# Patient Record
Sex: MALE | Race: WHITE | NOT HISPANIC OR LATINO | Employment: OTHER | URBAN - METROPOLITAN AREA
[De-identification: names, ages, dates, MRNs, and addresses within clinical notes are randomized per-mention and may not be internally consistent; named-entity substitution may affect disease eponyms.]

---

## 2018-02-07 ENCOUNTER — HOSPITAL ENCOUNTER (EMERGENCY)
Facility: HOSPITAL | Age: 78
Discharge: HOME | End: 2018-02-07
Attending: EMERGENCY MEDICINE
Payer: MEDICARE

## 2018-02-07 VITALS
SYSTOLIC BLOOD PRESSURE: 149 MMHG | TEMPERATURE: 99 F | HEART RATE: 101 BPM | WEIGHT: 195 LBS | OXYGEN SATURATION: 95 % | HEIGHT: 67 IN | BODY MASS INDEX: 30.61 KG/M2 | DIASTOLIC BLOOD PRESSURE: 88 MMHG | RESPIRATION RATE: 22 BRPM
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
## 2018-02-07 DIAGNOSIS — S01.81XA FACIAL LACERATION, INITIAL ENCOUNTER: ICD-10-CM
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
## 2018-02-07 DIAGNOSIS — F10.10 ALCOHOL ABUSE: ICD-10-CM

## 2018-02-07 DIAGNOSIS — S22.39XA RIB FRACTURE: ICD-10-CM
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
## 2018-02-07 LAB
ALBUMIN SERPL BCP-MCNC: 3.8 G/DL
ALP SERPL-CCNC: 67 U/L
ALT SERPL W/O P-5'-P-CCNC: 23 U/L
ANION GAP SERPL CALC-SCNC: 12 MMOL/L
APTT BLDCRRT: 31.7 SEC
AST SERPL-CCNC: 26 U/L
BASOPHILS # BLD AUTO: 0.01 K/UL
BASOPHILS NFR BLD: 0.1 %
BILIRUB SERPL-MCNC: 0.4 MG/DL
BUN SERPL-MCNC: 17 MG/DL
CALCIUM SERPL-MCNC: 9 MG/DL
CHLORIDE SERPL-SCNC: 104 MMOL/L
CO2 SERPL-SCNC: 22 MMOL/L
CREAT SERPL-MCNC: 1 MG/DL
DIFFERENTIAL METHOD: ABNORMAL
EOSINOPHIL # BLD AUTO: 0.2 K/UL
EOSINOPHIL NFR BLD: 1.9 %
ERYTHROCYTE [DISTWIDTH] IN BLOOD BY AUTOMATED COUNT: 13.1 %
EST. GFR  (AFRICAN AMERICAN): >60 ML/MIN/1.73 M^2
EST. GFR  (NON AFRICAN AMERICAN): >60 ML/MIN/1.73 M^2
ETHANOL SERPL-MCNC: 193 MG/DL
GLUCOSE SERPL-MCNC: 116 MG/DL
HCT VFR BLD AUTO: 42.5 %
HGB BLD-MCNC: 15 G/DL
INR PPP: 1
LYMPHOCYTES # BLD AUTO: 1.4 K/UL
LYMPHOCYTES NFR BLD: 18.1 %
MCH RBC QN AUTO: 32.7 PG
MCHC RBC AUTO-ENTMCNC: 35.3 G/DL
MCV RBC AUTO: 93 FL
MONOCYTES # BLD AUTO: 0.7 K/UL
MONOCYTES NFR BLD: 8.9 %
NEUTROPHILS # BLD AUTO: 5.5 K/UL
NEUTROPHILS NFR BLD: 71 %
PLATELET # BLD AUTO: 261 K/UL
PMV BLD AUTO: 8.9 FL
POTASSIUM SERPL-SCNC: 4.4 MMOL/L
PROT SERPL-MCNC: 7 G/DL
PROTHROMBIN TIME: 10.6 SEC
RBC # BLD AUTO: 4.59 M/UL
SODIUM SERPL-SCNC: 138 MMOL/L
WBC # BLD AUTO: 7.74 K/UL

## 2018-02-07 PROCEDURE — 99284 EMERGENCY DEPT VISIT MOD MDM: CPT | Mod: 25

## 2018-02-07 PROCEDURE — 90715 TDAP VACCINE 7 YRS/> IM: CPT | Performed by: EMERGENCY MEDICINE

## 2018-02-07 PROCEDURE — 93005 ELECTROCARDIOGRAM TRACING: CPT | Mod: 59

## 2018-02-07 PROCEDURE — 85730 THROMBOPLASTIN TIME PARTIAL: CPT

## 2018-02-07 PROCEDURE — 96365 THER/PROPH/DIAG IV INF INIT: CPT | Mod: 59

## 2018-02-07 PROCEDURE — 12013 RPR F/E/E/N/L/M 2.6-5.0 CM: CPT | Mod: RT

## 2018-02-07 PROCEDURE — 80320 DRUG SCREEN QUANTALCOHOLS: CPT

## 2018-02-07 PROCEDURE — 93010 ELECTROCARDIOGRAM REPORT: CPT | Mod: ,,, | Performed by: INTERNAL MEDICINE

## 2018-02-07 PROCEDURE — 90471 IMMUNIZATION ADMIN: CPT | Performed by: EMERGENCY MEDICINE

## 2018-02-07 PROCEDURE — 25000003 PHARM REV CODE 250: Performed by: EMERGENCY MEDICINE

## 2018-02-07 PROCEDURE — 80053 COMPREHEN METABOLIC PANEL: CPT

## 2018-02-07 PROCEDURE — 85610 PROTHROMBIN TIME: CPT

## 2018-02-07 PROCEDURE — 63600175 PHARM REV CODE 636 W HCPCS: Performed by: EMERGENCY MEDICINE

## 2018-02-07 PROCEDURE — 85025 COMPLETE CBC W/AUTO DIFF WBC: CPT

## 2018-02-07 RX ORDER — MULTIVITAMIN
TABLET ORAL
COMMUNITY

## 2018-02-07 RX ORDER — TERAZOSIN 5 MG/1
5 CAPSULE ORAL DAILY
COMMUNITY
Start: 2017-02-21 | End: 2021-07-02

## 2018-02-07 RX ORDER — LISINOPRIL AND HYDROCHLOROTHIAZIDE 10; 12.5 MG/1; MG/1
TABLET ORAL
COMMUNITY
Start: 2017-12-05 | End: 2023-04-11

## 2018-02-07 RX ORDER — FLUTICASONE PROPIONATE 50 MCG
1 SPRAY, SUSPENSION (ML) NASAL
COMMUNITY
Start: 2017-02-21

## 2018-02-07 RX ORDER — GLUCOSAMINE/CHONDRO SU A 500-400 MG
TABLET ORAL DAILY
COMMUNITY

## 2018-02-07 RX ORDER — ALPRAZOLAM 0.5 MG/1
0.5 TABLET ORAL DAILY
COMMUNITY
Start: 2017-11-29 | End: 2021-06-28 | Stop reason: SDUPTHER

## 2018-02-07 RX ORDER — ASPIRIN 325 MG
325 TABLET ORAL DAILY
COMMUNITY

## 2018-02-07 RX ADMIN — CLOSTRIDIUM TETANI TOXOID ANTIGEN (FORMALDEHYDE INACTIVATED), CORYNEBACTERIUM DIPHTHERIAE TOXOID ANTIGEN (FORMALDEHYDE INACTIVATED), BORDETELLA PERTUSSIS TOXOID ANTIGEN (GLUTARALDEHYDE INACTIVATED), BORDETELLA PERTUSSIS FILAMENTOUS HEMAGGLUTININ ANTIGEN (FORMALDEHYDE INACTIVATED), BORDETELLA PERTUSSIS PERTACTIN ANTIGEN, AND BORDETELLA PERTUSSIS FIMBRIAE 2/3 ANTIGEN 0.5 ML: 5; 2; 2.5; 5; 3; 5 INJECTION, SUSPENSION INTRAMUSCULAR at 07:02

## 2018-02-07 RX ADMIN — THIAMINE HYDROCHLORIDE 100 MG: 100 INJECTION, SOLUTION INTRAMUSCULAR; INTRAVENOUS at 07:02

## 2018-02-07 RX ADMIN — SODIUM CHLORIDE 1000 ML: 0.9 INJECTION, SOLUTION INTRAVENOUS at 07:02

## 2018-02-07 NOTE — ED PROVIDER NOTES
SCRIBE #1 NOTE: I, Patrick Spencer, am scribing for, and in the presence of, Klaudia Matt MD. I have scribed the entire note.      History      Chief Complaint   Patient presents with    Laceration     trip after dizziness this morning after ETOH and hit head. Lac to R forehead. Denies LOC.        Review of patient's allergies indicates:  No Known Allergies     HPI   HPI    2/7/2018, 7:00 AM   History obtained from the patient and wife      History of Present Illness: Dano Chau is a 77 y.o. male patient who presents to the Emergency Department for laceration which onset suddenly this morning after pt fell. Sxs are constant and moderate in severity. Laceration located above right eyebrow. Pt reports ETOH use last night. Pt states he slipped and fell, hitting head on nightstand after standing up to use bathroom. There are no mitigating or exacerbating factors noted.  Pt denies any fever, N/V/D, LOC, SZ, SOB, CP, neck pain, back pain, ABD pain, and all other sxs at this time. No further complaints or concerns at this time.       Arrival mode: Personal vehicle     PCP: No primary care provider on file.     Past Medical History:   Diagnosis Date    Anxiety     HTN (hypertension)     Restless leg syndrome        Past Surgical History:  Past surgical history reviewed not relevant      Family History:  Family history reviewed not relevant      Social History:  Social History    Social History     Social History    Marital status:      Spouse name: N/A    Number of children: N/A    Years of education: N/A     Occupational History    Not on file.     Social History Main Topics    Smoking status: Former Smoker     Quit date: 1980    Smokeless tobacco: Never Used    Alcohol use Yes    Drug use: No    Sexual activity: Yes     Other Topics Concern    Not on file     Social History Narrative    No narrative on file             ROS   Review of Systems   Constitutional: Negative for fever.    HENT: Negative for sore throat.    Respiratory: Negative for shortness of breath.    Cardiovascular: Negative for chest pain.   Gastrointestinal: Negative for abdominal pain, constipation, diarrhea, nausea and vomiting.   Genitourinary: Negative for dysuria.   Musculoskeletal: Negative for back pain, gait problem and neck pain.   Skin: Positive for wound (lac to right forehead). Negative for rash.   Neurological: Negative for seizures, syncope, weakness, numbness and headaches.   Hematological: Does not bruise/bleed easily.     Physical Exam      Initial Vitals [02/07/18 0658]   BP Pulse Resp Temp SpO2   137/82 (!) 111 (!) 22 98.5 °F (36.9 °C) (!) 88 %      MAP       100.33          Physical Exam  Nursing Notes and Vital Signs Reviewed.  Constitutional: Patient is in no acute distress. Well-developed and well-nourished.  Head: 4 cm uncomplicated laceration superior to right eyebrow, no hematoma, no active bleeding, no contusion. Normocephalic.  Eyes: PERRL. EOM intact. Conjunctivae are not pale. No scleral icterus.  ENT: Mucous membranes are moist. Oropharynx is clear and symmetric.    Neck: Supple. Full ROM. No lymphadenopathy.  Cardiovascular: Tachycardic. Regular rhythm. No murmurs, rubs, or gallops. Distal pulses are 2+ and symmetric.  Pulmonary/Chest: No respiratory distress. Clear to auscultation bilaterally. No wheezing or rales.  Abdominal: Soft and non-distended.  There is no tenderness.  No rebound, guarding, or rigidity. Good bowel sounds.  Genitourinary: No CVA tenderness  Musculoskeletal: Moves all extremities. No obvious deformities. No edema. No calf tenderness.  Skin: Warm and dry.  Neurological:  Alert, awake, and appropriate.  Normal speech.  No acute focal neurological deficits are appreciated.  Psychiatric: Normal affect. Good eye contact. Appropriate in content.    ED Course    Lac Repair  Date/Time: 2/7/2018 8:19 AM  Performed by: ISA STEPHENSON  Authorized by: ISA STEPHENSON  "  Body area: head/neck  Location details: right eyebrow  Laceration length: 4 cm  Foreign bodies: no foreign bodies  Tendon involvement: none  Nerve involvement: none  Vascular damage: no  Patient sedated: no  Preparation: Patient was prepped and draped in the usual sterile fashion.  Irrigation solution: saline  Amount of cleaning: standard  Debridement: none  Degree of undermining: none  Skin closure: glue (dermabond)  Technique: simple  Dressing: steri strips.  Patient tolerance: Patient tolerated the procedure well with no immediate complications        ED Vital Signs:  Vitals:    02/07/18 0658 02/07/18 0709 02/07/18 0733 02/07/18 0750   BP: 137/82  139/85 130/82   Pulse: (!) 111 104 98 95   Resp: (!) 22  (!) 22    Temp: 98.5 °F (36.9 °C)      TempSrc: Oral      SpO2: (!) 88% (!) 94% (!) 94%    Weight: 88.5 kg (195 lb)      Height: 5' 7" (1.702 m)       02/07/18 0752 02/07/18 0754 02/07/18 0826   BP: 131/81 139/89 (!) 149/88   Pulse: 103 110 101   Resp:   (!) 22   Temp:      TempSrc:      SpO2:   95%   Weight:      Height:          Abnormal Lab Results:  Labs Reviewed   CBC W/ AUTO DIFFERENTIAL - Abnormal; Notable for the following:        Result Value    RBC 4.59 (*)     MCH 32.7 (*)     MPV 8.9 (*)     All other components within normal limits   COMPREHENSIVE METABOLIC PANEL - Abnormal; Notable for the following:     CO2 22 (*)     Glucose 116 (*)     All other components within normal limits   ALCOHOL,MEDICAL (ETHANOL) - Abnormal; Notable for the following:     Alcohol, Medical, Serum 193 (*)     All other components within normal limits   PROTIME-INR   APTT        All Lab Results:  Results for orders placed or performed during the hospital encounter of 02/07/18   CBC auto differential   Result Value Ref Range    WBC 7.74 3.90 - 12.70 K/uL    RBC 4.59 (L) 4.60 - 6.20 M/uL    Hemoglobin 15.0 14.0 - 18.0 g/dL    Hematocrit 42.5 40.0 - 54.0 %    MCV 93 82 - 98 fL    MCH 32.7 (H) 27.0 - 31.0 pg    MCHC 35.3 32.0 - " 36.0 g/dL    RDW 13.1 11.5 - 14.5 %    Platelets 261 150 - 350 K/uL    MPV 8.9 (L) 9.2 - 12.9 fL    Gran # (ANC) 5.5 1.8 - 7.7 K/uL    Lymph # 1.4 1.0 - 4.8 K/uL    Mono # 0.7 0.3 - 1.0 K/uL    Eos # 0.2 0.0 - 0.5 K/uL    Baso # 0.01 0.00 - 0.20 K/uL    Gran% 71.0 38.0 - 73.0 %    Lymph% 18.1 18.0 - 48.0 %    Mono% 8.9 4.0 - 15.0 %    Eosinophil% 1.9 0.0 - 8.0 %    Basophil% 0.1 0.0 - 1.9 %    Differential Method Automated    Comprehensive metabolic panel   Result Value Ref Range    Sodium 138 136 - 145 mmol/L    Potassium 4.4 3.5 - 5.1 mmol/L    Chloride 104 95 - 110 mmol/L    CO2 22 (L) 23 - 29 mmol/L    Glucose 116 (H) 70 - 110 mg/dL    BUN, Bld 17 8 - 23 mg/dL    Creatinine 1.0 0.5 - 1.4 mg/dL    Calcium 9.0 8.7 - 10.5 mg/dL    Total Protein 7.0 6.0 - 8.4 g/dL    Albumin 3.8 3.5 - 5.2 g/dL    Total Bilirubin 0.4 0.1 - 1.0 mg/dL    Alkaline Phosphatase 67 55 - 135 U/L    AST 26 10 - 40 U/L    ALT 23 10 - 44 U/L    Anion Gap 12 8 - 16 mmol/L    eGFR if African American >60 >60 mL/min/1.73 m^2    eGFR if non African American >60 >60 mL/min/1.73 m^2   Ethanol   Result Value Ref Range    Alcohol, Medical, Serum 193 (H) <10 mg/dL   Protime-INR   Result Value Ref Range    Prothrombin Time 10.6 9.0 - 12.5 sec    INR 1.0 0.8 - 1.2   APTT   Result Value Ref Range    aPTT 31.7 21.0 - 32.0 sec         Imaging Results:  Imaging Results          CT Head Without Contrast (Final result)  Result time 02/07/18 08:13:16    Final result by Benjaimn Vann III, MD (02/07/18 08:13:16)                 Impression:     A atrophy with bilateral white matter changes which may be related to microvascular disease. No bleed or other acute intracranial event suggested.    All CT scans at this facility use dose modulation, iterative reconstruction, and/or weight based dosing when appropriate to reduce radiation dose to as low as reasonably achievable.      Electronically signed by: BENJAMIN VANN MD  Date:     02/07/18  Time:    08:13               Narrative:    CT of the head without contrast.    Clinical indication: Headache, post trauma .    Standard noncontrast CT scan of the brain. No previous for comparison. The scan is degraded by motion artifact.        The brain and ventricles show mild changes of atrophy. Minimal to mild low attenuation changes are noted in the periventricular and subcortical white matter bilaterally, most likely related to microvascular disease. No hemorrhage, mass lesion, hydrocephalus, or midline shift. No acute/depressed skull fracture.                             X-Ray Chest AP Portable (Final result)  Result time 02/07/18 07:47:31    Final result by COLLEEN El Sr., MD (02/07/18 07:47:31)                 Impression:        1. There is a poorly visualized fracture in the lateral aspect of the right 10th rib.  2. The lungs are clear.       Electronically signed by: COLLEEN EL MD  Date:     02/07/18  Time:    07:47              Narrative:    One-view chest x-ray    Clinical History: Unspecified fall, initial encounter    Finding: The size of the heart is normal. The lungs are clear. There is no pneumothorax.  The costophrenic angles are sharp. There is a poorly visualized fracture in the lateral aspect of the right 10th rib.                                      The EKG was ordered, reviewed, and independently interpreted by the ED provider.  Interpretation time: 7:27  Rate: 101 BPM  Rhythm: sinus tachycardia  Interpretation: Low voltage QRS. No STEMI.    The Emergency Provider reviewed the vital signs and test results, which are outlined above.    ED Discussion     8:20 AM: Reassessed pt. Pt states their condition has improved at this time. Pt reports rib Fx seen on CXR is old. Discussed with pt all pertinent ED information and results. Discussed plan of treatment with pt. Gave pt all f/u and return to the ED instructions. Pt being discharged to care of wife at the bedside.  All questions and concerns were  addressed at this time. Pt understands and agrees to plan as discussed. Pt is stable for discharge.     I discussed wound care precautions with patient and/or family/caretaker; specifically that all wounds have risk of infection despite efforts to cleanse and debride the wound; and there is a risk of an occult foreign body (and thus increased risk of infection) despite a negative examination.  I discussed with patient need to return for any signs of infection, specifically redness, increased pain, fever, drainage of pus, or any concern, immediately.    ED Medication(s):  Medications   sodium chloride 0.9% bolus 1,000 mL (0 mLs Intravenous Stopped 2/7/18 0828)   thiamine (B-1) 100 mg in dextrose 5 % 50 mL IVPB (0 mg Intravenous Stopped 2/7/18 0828)   Tdap vaccine injection 0.5 mL (0.5 mLs Intramuscular Given 2/7/18 0728)       There are no discharge medications for this patient.      Follow-up Information     Local Primary Care Doctor. Schedule an appointment as soon as possible for a visit in 2 days.    Why:  Return to the Emergency Room, If symptoms worsen                   Medical Decision Making    Medical Decision Making:   Clinical Tests:   Lab Tests: Reviewed and Ordered  Radiological Study: Ordered and Reviewed  Medical Tests: Ordered and Reviewed           Scribe Attestation:   Scribe #1: I performed the above scribed service and the documentation accurately describes the services I performed. I attest to the accuracy of the note.    Attending:   Physician Attestation Statement for Scribe #1: I, Klaudia Matt MD, personally performed the services described in this documentation, as scribed by Patrick Spencer, in my presence, and it is both accurate and complete.          Clinical Impression       ICD-10-CM ICD-9-CM   1. Fall at home, initial encounter W19.XXXA E888.9    Y92.099 E849.0   2. Fall W19.XXXA E888.9   3. Rib fracture S22.39XA 807.00   4. Facial laceration, initial encounter S01.81XA  873.40   5. Alcohol abuse F10.10 305.00       Disposition:   Disposition: Discharged  Condition: Stable         Klaudia Matt MD  02/08/18 1039

## 2018-02-07 NOTE — ED NOTES
Patient complains of head laceration to R side of face. Bleeding controlled at this time. Symptoms have been present since fall onto nightstand this morning after an evening of drinking alcohol.   Level of Consciousness: The patient is awake, alert, and oriented with appropriate affect and speech; oriented to person, place and time.  Appearance: Sitting up in ED stretcher with no acute distress noted. Clothing and hygiene are clean and worn appropriately.  Skin:  Head laceration noted to R side of forehead other skin is intact; color consistent with ethnicity.    HEENT: Laceration noted to R side of forehead.  Musculoskeletal: Moves all extremities well in full range of motion. No obvious deformities or swelling noted.  Respiratory: Airway open and patent, respirations spontaneous, even and unlabored. No accessory muscles in use.   Cardiac: Regular rate, no peripheral edema noted..  Abdomen:  No distention noted.  Neurologic: PERRLA, face exhibits symmetrical expression, reports normal sensation to all extremities and face.    Patient verbalized understanding of status and plan of care.

## 2021-06-28 ENCOUNTER — OFFICE VISIT (OUTPATIENT)
Dept: INTERNAL MEDICINE | Facility: CLINIC | Age: 81
End: 2021-06-28
Payer: MEDICARE

## 2021-06-28 ENCOUNTER — HOSPITAL ENCOUNTER (OUTPATIENT)
Dept: RADIOLOGY | Facility: HOSPITAL | Age: 81
Discharge: HOME | End: 2021-06-28
Attending: FAMILY MEDICINE
Payer: MEDICARE

## 2021-06-28 VITALS
HEIGHT: 67 IN | HEART RATE: 103 BPM | OXYGEN SATURATION: 96 % | DIASTOLIC BLOOD PRESSURE: 70 MMHG | SYSTOLIC BLOOD PRESSURE: 110 MMHG | BODY MASS INDEX: 31.83 KG/M2 | WEIGHT: 202.81 LBS | TEMPERATURE: 97 F

## 2021-06-28 DIAGNOSIS — Z28.39 IMMUNIZATION DEFICIENCY: ICD-10-CM

## 2021-06-28 DIAGNOSIS — I10 ESSENTIAL HYPERTENSION: ICD-10-CM

## 2021-06-28 DIAGNOSIS — G47.00 INSOMNIA, UNSPECIFIED TYPE: ICD-10-CM

## 2021-06-28 DIAGNOSIS — M47.812 SPONDYLOSIS OF CERVICAL REGION WITHOUT MYELOPATHY OR RADICULOPATHY: ICD-10-CM

## 2021-06-28 DIAGNOSIS — R06.02 SHORTNESS OF BREATH: ICD-10-CM

## 2021-06-28 DIAGNOSIS — M47.816 SPONDYLOSIS OF LUMBAR REGION WITHOUT MYELOPATHY OR RADICULOPATHY: ICD-10-CM

## 2021-06-28 DIAGNOSIS — Z76.89 ENCOUNTER TO ESTABLISH CARE WITH NEW DOCTOR: Primary | ICD-10-CM

## 2021-06-28 PROCEDURE — 99999 PR PBB SHADOW E&M-NEW PATIENT-LVL IV: CPT | Mod: PBBFAC,,, | Performed by: FAMILY MEDICINE

## 2021-06-28 PROCEDURE — 1159F PR MEDICATION LIST DOCUMENTED IN MEDICAL RECORD: ICD-10-PCS | Mod: S$GLB,,, | Performed by: FAMILY MEDICINE

## 2021-06-28 PROCEDURE — 71046 X-RAY EXAM CHEST 2 VIEWS: CPT | Mod: TC

## 2021-06-28 PROCEDURE — 1101F PR PT FALLS ASSESS DOC 0-1 FALLS W/OUT INJ PAST YR: ICD-10-PCS | Mod: CPTII,S$GLB,, | Performed by: FAMILY MEDICINE

## 2021-06-28 PROCEDURE — 1101F PT FALLS ASSESS-DOCD LE1/YR: CPT | Mod: CPTII,S$GLB,, | Performed by: FAMILY MEDICINE

## 2021-06-28 PROCEDURE — 99204 OFFICE O/P NEW MOD 45 MIN: CPT | Mod: S$GLB,,, | Performed by: FAMILY MEDICINE

## 2021-06-28 PROCEDURE — 99999 PR PBB SHADOW E&M-NEW PATIENT-LVL IV: ICD-10-PCS | Mod: PBBFAC,,, | Performed by: FAMILY MEDICINE

## 2021-06-28 PROCEDURE — 1125F AMNT PAIN NOTED PAIN PRSNT: CPT | Mod: S$GLB,,, | Performed by: FAMILY MEDICINE

## 2021-06-28 PROCEDURE — 3288F PR FALLS RISK ASSESSMENT DOCUMENTED: ICD-10-PCS | Mod: CPTII,S$GLB,, | Performed by: FAMILY MEDICINE

## 2021-06-28 PROCEDURE — 71046 X-RAY EXAM CHEST 2 VIEWS: CPT | Mod: 26,,, | Performed by: RADIOLOGY

## 2021-06-28 PROCEDURE — 1125F PR PAIN SEVERITY QUANTIFIED, PAIN PRESENT: ICD-10-PCS | Mod: S$GLB,,, | Performed by: FAMILY MEDICINE

## 2021-06-28 PROCEDURE — 1159F MED LIST DOCD IN RCRD: CPT | Mod: S$GLB,,, | Performed by: FAMILY MEDICINE

## 2021-06-28 PROCEDURE — 99204 PR OFFICE/OUTPT VISIT, NEW, LEVL IV, 45-59 MIN: ICD-10-PCS | Mod: S$GLB,,, | Performed by: FAMILY MEDICINE

## 2021-06-28 PROCEDURE — 71046 XR CHEST PA AND LATERAL: ICD-10-PCS | Mod: 26,,, | Performed by: RADIOLOGY

## 2021-06-28 PROCEDURE — 3288F FALL RISK ASSESSMENT DOCD: CPT | Mod: CPTII,S$GLB,, | Performed by: FAMILY MEDICINE

## 2021-06-28 RX ORDER — ACETAMINOPHEN 325 MG/1
650 TABLET ORAL
COMMUNITY

## 2021-06-28 RX ORDER — SAW PALMETTO 160 MG
160 CAPSULE ORAL
COMMUNITY

## 2021-06-28 RX ORDER — ALPRAZOLAM 0.5 MG/1
0.5 TABLET ORAL NIGHTLY PRN
Qty: 90 TABLET | Refills: 0 | Status: SHIPPED | OUTPATIENT
Start: 2021-06-28 | End: 2021-07-06

## 2021-06-28 RX ORDER — LISINOPRIL 10 MG/1
10 TABLET ORAL DAILY
Qty: 90 TABLET | Refills: 3 | Status: SHIPPED | OUTPATIENT
Start: 2021-06-28 | End: 2021-07-02

## 2021-06-28 RX ORDER — TRAZODONE HYDROCHLORIDE 150 MG/1
1 TABLET ORAL NIGHTLY
COMMUNITY
Start: 2021-05-21 | End: 2021-10-21

## 2021-07-05 ENCOUNTER — TELEPHONE (OUTPATIENT)
Dept: INTERNAL MEDICINE | Facility: CLINIC | Age: 81
End: 2021-07-05

## 2021-07-06 RX ORDER — LISINOPRIL 10 MG/1
TABLET ORAL
Qty: 90 TABLET | Refills: 3 | Status: SHIPPED | OUTPATIENT
Start: 2021-07-06 | End: 2022-08-31

## 2021-07-06 RX ORDER — ALPRAZOLAM 0.5 MG/1
0.5 TABLET ORAL NIGHTLY PRN
Qty: 90 TABLET | Refills: 0 | Status: SHIPPED | OUTPATIENT
Start: 2021-07-06 | End: 2021-11-10

## 2021-07-06 RX ORDER — TERAZOSIN 5 MG/1
CAPSULE ORAL
Qty: 90 CAPSULE | Refills: 3 | Status: SHIPPED | OUTPATIENT
Start: 2021-07-06 | End: 2022-07-20

## 2021-07-08 ENCOUNTER — TELEPHONE (OUTPATIENT)
Dept: INTERNAL MEDICINE | Facility: CLINIC | Age: 81
End: 2021-07-08

## 2021-10-21 RX ORDER — TRAZODONE HYDROCHLORIDE 150 MG/1
150 TABLET ORAL NIGHTLY
Qty: 90 TABLET | Refills: 0 | Status: SHIPPED | OUTPATIENT
Start: 2021-10-21 | End: 2021-12-28

## 2021-11-08 ENCOUNTER — IMMUNIZATION (OUTPATIENT)
Dept: PRIMARY CARE CLINIC | Facility: CLINIC | Age: 81
End: 2021-11-08
Payer: MEDICARE

## 2021-11-08 DIAGNOSIS — Z23 NEED FOR VACCINATION: Primary | ICD-10-CM

## 2021-11-08 PROCEDURE — 91300 COVID-19, MRNA, LNP-S, PF, 30 MCG/0.3 ML DOSE VACCINE: CPT | Mod: PBBFAC | Performed by: FAMILY MEDICINE

## 2021-11-08 PROCEDURE — 0003A COVID-19, MRNA, LNP-S, PF, 30 MCG/0.3 ML DOSE VACCINE: CPT | Mod: CV19,PBBFAC | Performed by: FAMILY MEDICINE

## 2021-11-10 RX ORDER — ALPRAZOLAM 0.5 MG/1
TABLET ORAL
Qty: 90 TABLET | Refills: 0 | Status: SHIPPED | OUTPATIENT
Start: 2021-11-10 | End: 2022-02-25

## 2022-02-22 NOTE — TELEPHONE ENCOUNTER
No new care gaps identified.  Powered by [employer]. Reference number: [id number].   2/22/2022 3:12:09 PM CST

## 2022-02-25 RX ORDER — ALPRAZOLAM 0.5 MG/1
TABLET ORAL
Qty: 30 TABLET | Refills: 0 | Status: SHIPPED | OUTPATIENT
Start: 2022-02-25 | End: 2023-04-11 | Stop reason: SDUPTHER

## 2022-03-11 NOTE — TELEPHONE ENCOUNTER
No new care gaps identified.  Powered by [employer]. Reference number: [id number].   3/11/2022 2:50:49 AM CST

## 2022-03-15 RX ORDER — TRAZODONE HYDROCHLORIDE 150 MG/1
150 TABLET ORAL NIGHTLY
Qty: 90 TABLET | Refills: 0 | Status: SHIPPED | OUTPATIENT
Start: 2022-03-15 | End: 2022-05-27

## 2022-03-15 NOTE — TELEPHONE ENCOUNTER
This Rx Request does not qualify for assessment with the [facility]   Please review protocol details and the Care Due Message for extra clinical information    Reasons Rx Request may be deferred:  1. Labs/Vitals overdue  2. Labs/Vitals abnormal  3. Patient has been seen in the ED/Hospital since the last PCP visit  4. Medication is non-delegated  5. Medication associated diagnosis code is outside of scope  6. Provider is a non-participating provider  7. Visit criteria not met (Patient needs to be seen at least every 15 months by PCP)    Note composed:2:32 PM 03/15/2022

## 2022-07-06 DIAGNOSIS — I10 ESSENTIAL HYPERTENSION: ICD-10-CM

## 2023-04-04 NOTE — TELEPHONE ENCOUNTER
Care Due:                  Date            Visit Type   Department     Provider  --------------------------------------------------------------------------------                                NP -                              PRIMARY      [facility] INTER[facility]  Last Visit: 06-      CARE ([facility])   DAYA Santacruz                              EP -                              PRIMARY      [facility] INTERNAL  Next Visit: 04-      CARE ([facility])   DAYA Santacruz                                                            Last  Test          Frequency    Reason                     Performed    Due Date  --------------------------------------------------------------------------------    CMP.........  12 months..  lisinopriL...............  06- 06-    [facility] Embedded Care Gaps. Reference number: [id number]. 4/04/2023   3:55:17 PM CDT

## 2023-04-04 NOTE — TELEPHONE ENCOUNTER
----- Message from She Palma sent at 4/4/2023  3:44 PM CDT -----  Regarding: refill  Can the clinic reply in [facility]: no      Please refill the medication(s) listed below.   Please call the patient when the prescription(s) is ready for  at this phone number         Medication #1 lisinopriL 10 MG tablet    Medication #2      Preferred Pharmacy:     [facility] - [place], [place] - [address]  [address] [address]  Phone: [phone] Fax: [phone]

## 2023-04-05 RX ORDER — LISINOPRIL 10 MG/1
TABLET ORAL
Qty: 90 TABLET | Refills: 0 | Status: SHIPPED | OUTPATIENT
Start: 2023-04-05 | End: 2023-04-11 | Stop reason: SDUPTHER

## 2023-04-11 ENCOUNTER — LAB VISIT (OUTPATIENT)
Dept: LAB | Facility: HOSPITAL | Age: 83
End: 2023-04-11
Attending: FAMILY MEDICINE
Payer: MEDICARE

## 2023-04-11 ENCOUNTER — OFFICE VISIT (OUTPATIENT)
Dept: INTERNAL MEDICINE | Facility: CLINIC | Age: 83
End: 2023-04-11
Payer: MEDICARE

## 2023-04-11 VITALS
TEMPERATURE: 98 F | BODY MASS INDEX: 31.18 KG/M2 | SYSTOLIC BLOOD PRESSURE: 140 MMHG | WEIGHT: 198.63 LBS | HEIGHT: 67 IN | OXYGEN SATURATION: 96 % | DIASTOLIC BLOOD PRESSURE: 86 MMHG | HEART RATE: 104 BPM

## 2023-04-11 DIAGNOSIS — Z00.00 ANNUAL PHYSICAL EXAM: Primary | ICD-10-CM

## 2023-04-11 DIAGNOSIS — I10 ESSENTIAL HYPERTENSION: ICD-10-CM

## 2023-04-11 DIAGNOSIS — Z28.39 IMMUNIZATION DEFICIENCY: ICD-10-CM

## 2023-04-11 DIAGNOSIS — R06.02 SHORTNESS OF BREATH: ICD-10-CM

## 2023-04-11 DIAGNOSIS — F13.20 SEDATIVE HYPNOTIC OR ANXIOLYTIC DEPENDENCE: ICD-10-CM

## 2023-04-11 DIAGNOSIS — G47.00 INSOMNIA, UNSPECIFIED TYPE: ICD-10-CM

## 2023-04-11 DIAGNOSIS — J41.1 CHRONIC BRONCHITIS WITH PRODUCTIVE MUCOPURULENT COUGH: ICD-10-CM

## 2023-04-11 PROBLEM — Z76.89 ENCOUNTER TO ESTABLISH CARE WITH NEW DOCTOR: Status: RESOLVED | Noted: 2021-06-28 | Resolved: 2023-04-11

## 2023-04-11 PROBLEM — I70.0 AORTIC ATHEROSCLEROSIS: Status: ACTIVE | Noted: 2023-04-11

## 2023-04-11 LAB
ALBUMIN SERPL BCP-MCNC: 3.6 G/DL (ref 3.5–5.2)
ALP SERPL-CCNC: 113 U/L (ref 55–135)
ALT SERPL W/O P-5'-P-CCNC: 19 U/L (ref 10–44)
ANION GAP SERPL CALC-SCNC: 9 MMOL/L (ref 8–16)
AST SERPL-CCNC: 24 U/L (ref 10–40)
BASOPHILS # BLD AUTO: 0.04 K/UL (ref 0–0.2)
BASOPHILS NFR BLD: 0.6 % (ref 0–1.9)
BILIRUB SERPL-MCNC: 0.5 MG/DL (ref 0.1–1)
BUN SERPL-MCNC: 17 MG/DL (ref 8–23)
CALCIUM SERPL-MCNC: 9 MG/DL (ref 8.7–10.5)
CHLORIDE SERPL-SCNC: 106 MMOL/L (ref 95–110)
CHOLEST SERPL-MCNC: 163 MG/DL (ref 120–199)
CHOLEST/HDLC SERPL: 4.5 {RATIO} (ref 2–5)
CO2 SERPL-SCNC: 25 MMOL/L (ref 23–29)
CREAT SERPL-MCNC: 1 MG/DL (ref 0.5–1.4)
DIFFERENTIAL METHOD: ABNORMAL
EOSINOPHIL # BLD AUTO: 0.2 K/UL (ref 0–0.5)
EOSINOPHIL NFR BLD: 3.1 % (ref 0–8)
ERYTHROCYTE [DISTWIDTH] IN BLOOD BY AUTOMATED COUNT: 13.1 % (ref 11.5–14.5)
EST. GFR  (NO RACE VARIABLE): >60 ML/MIN/1.73 M^2
GLUCOSE SERPL-MCNC: 118 MG/DL (ref 70–110)
HCT VFR BLD AUTO: 47.4 % (ref 40–54)
HDLC SERPL-MCNC: 36 MG/DL (ref 40–75)
HDLC SERPL: 22.1 % (ref 20–50)
HGB BLD-MCNC: 15.5 G/DL (ref 14–18)
IMM GRANULOCYTES # BLD AUTO: 0.03 K/UL (ref 0–0.04)
IMM GRANULOCYTES NFR BLD AUTO: 0.4 % (ref 0–0.5)
LDLC SERPL CALC-MCNC: 105 MG/DL (ref 63–159)
LYMPHOCYTES # BLD AUTO: 1.2 K/UL (ref 1–4.8)
LYMPHOCYTES NFR BLD: 17.9 % (ref 18–48)
MCH RBC QN AUTO: 32.7 PG (ref 27–31)
MCHC RBC AUTO-ENTMCNC: 32.7 G/DL (ref 32–36)
MCV RBC AUTO: 100 FL (ref 82–98)
MONOCYTES # BLD AUTO: 0.9 K/UL (ref 0.3–1)
MONOCYTES NFR BLD: 12.8 % (ref 4–15)
NEUTROPHILS # BLD AUTO: 4.5 K/UL (ref 1.8–7.7)
NEUTROPHILS NFR BLD: 65.2 % (ref 38–73)
NONHDLC SERPL-MCNC: 127 MG/DL
NRBC BLD-RTO: 0 /100 WBC
PLATELET # BLD AUTO: 319 K/UL (ref 150–450)
PMV BLD AUTO: 10.3 FL (ref 9.2–12.9)
POTASSIUM SERPL-SCNC: 4.2 MMOL/L (ref 3.5–5.1)
PROT SERPL-MCNC: 6.8 G/DL (ref 6–8.4)
RBC # BLD AUTO: 4.74 M/UL (ref 4.6–6.2)
SODIUM SERPL-SCNC: 140 MMOL/L (ref 136–145)
TRIGL SERPL-MCNC: 110 MG/DL (ref 30–150)
TSH SERPL DL<=0.005 MIU/L-ACNC: 2.27 UIU/ML (ref 0.4–4)
WBC # BLD AUTO: 6.82 K/UL (ref 3.9–12.7)

## 2023-04-11 PROCEDURE — 3079F DIAST BP 80-89 MM HG: CPT | Mod: CPTII,S$GLB,, | Performed by: FAMILY MEDICINE

## 2023-04-11 PROCEDURE — 80061 LIPID PANEL: CPT | Performed by: FAMILY MEDICINE

## 2023-04-11 PROCEDURE — 1159F PR MEDICATION LIST DOCUMENTED IN MEDICAL RECORD: ICD-10-PCS | Mod: CPTII,S$GLB,, | Performed by: FAMILY MEDICINE

## 2023-04-11 PROCEDURE — 85025 COMPLETE CBC W/AUTO DIFF WBC: CPT | Performed by: FAMILY MEDICINE

## 2023-04-11 PROCEDURE — 1159F MED LIST DOCD IN RCRD: CPT | Mod: CPTII,S$GLB,, | Performed by: FAMILY MEDICINE

## 2023-04-11 PROCEDURE — 3077F PR MOST RECENT SYSTOLIC BLOOD PRESSURE >= 140 MM HG: ICD-10-PCS | Mod: CPTII,S$GLB,, | Performed by: FAMILY MEDICINE

## 2023-04-11 PROCEDURE — 80053 COMPREHEN METABOLIC PANEL: CPT | Performed by: FAMILY MEDICINE

## 2023-04-11 PROCEDURE — 36415 COLL VENOUS BLD VENIPUNCTURE: CPT | Performed by: FAMILY MEDICINE

## 2023-04-11 PROCEDURE — 99999 PR PBB SHADOW E&M-EST. PATIENT-LVL III: CPT | Mod: PBBFAC,,, | Performed by: FAMILY MEDICINE

## 2023-04-11 PROCEDURE — 90677 PCV20 VACCINE IM: CPT | Mod: S$GLB,,, | Performed by: FAMILY MEDICINE

## 2023-04-11 PROCEDURE — 1101F PT FALLS ASSESS-DOCD LE1/YR: CPT | Mod: CPTII,S$GLB,, | Performed by: FAMILY MEDICINE

## 2023-04-11 PROCEDURE — 1101F PR PT FALLS ASSESS DOC 0-1 FALLS W/OUT INJ PAST YR: ICD-10-PCS | Mod: CPTII,S$GLB,, | Performed by: FAMILY MEDICINE

## 2023-04-11 PROCEDURE — 3079F PR MOST RECENT DIASTOLIC BLOOD PRESSURE 80-89 MM HG: ICD-10-PCS | Mod: CPTII,S$GLB,, | Performed by: FAMILY MEDICINE

## 2023-04-11 PROCEDURE — 84443 ASSAY THYROID STIM HORMONE: CPT | Performed by: FAMILY MEDICINE

## 2023-04-11 PROCEDURE — 99397 PR PREVENTIVE VISIT,EST,65 & OVER: ICD-10-PCS | Mod: S$GLB,,, | Performed by: FAMILY MEDICINE

## 2023-04-11 PROCEDURE — 90677 PNEUMOCOCCAL CONJUGATE VACCINE 20-VALENT: ICD-10-PCS | Mod: S$GLB,,, | Performed by: FAMILY MEDICINE

## 2023-04-11 PROCEDURE — 99999 PR PBB SHADOW E&M-EST. PATIENT-LVL III: ICD-10-PCS | Mod: PBBFAC,,, | Performed by: FAMILY MEDICINE

## 2023-04-11 PROCEDURE — 1126F PR PAIN SEVERITY QUANTIFIED, NO PAIN PRESENT: ICD-10-PCS | Mod: CPTII,S$GLB,, | Performed by: FAMILY MEDICINE

## 2023-04-11 PROCEDURE — G0009 PNEUMOCOCCAL CONJUGATE VACCINE 20-VALENT: ICD-10-PCS | Mod: S$GLB,,, | Performed by: FAMILY MEDICINE

## 2023-04-11 PROCEDURE — 1126F AMNT PAIN NOTED NONE PRSNT: CPT | Mod: CPTII,S$GLB,, | Performed by: FAMILY MEDICINE

## 2023-04-11 PROCEDURE — 3077F SYST BP >= 140 MM HG: CPT | Mod: CPTII,S$GLB,, | Performed by: FAMILY MEDICINE

## 2023-04-11 PROCEDURE — G0009 ADMIN PNEUMOCOCCAL VACCINE: HCPCS | Mod: S$GLB,,, | Performed by: FAMILY MEDICINE

## 2023-04-11 PROCEDURE — 3288F FALL RISK ASSESSMENT DOCD: CPT | Mod: CPTII,S$GLB,, | Performed by: FAMILY MEDICINE

## 2023-04-11 PROCEDURE — 99397 PER PM REEVAL EST PAT 65+ YR: CPT | Mod: S$GLB,,, | Performed by: FAMILY MEDICINE

## 2023-04-11 PROCEDURE — 3288F PR FALLS RISK ASSESSMENT DOCUMENTED: ICD-10-PCS | Mod: CPTII,S$GLB,, | Performed by: FAMILY MEDICINE

## 2023-04-11 RX ORDER — LISINOPRIL 10 MG/1
TABLET ORAL
Qty: 90 TABLET | Refills: 3 | Status: SHIPPED | OUTPATIENT
Start: 2023-04-11

## 2023-04-11 RX ORDER — ALPRAZOLAM 0.5 MG/1
0.5 TABLET ORAL NIGHTLY PRN
Qty: 30 TABLET | Refills: 0 | Status: SHIPPED | OUTPATIENT
Start: 2023-04-11 | End: 2023-06-09

## 2023-04-11 RX ORDER — TERAZOSIN 5 MG/1
CAPSULE ORAL
Qty: 90 CAPSULE | Refills: 3 | Status: SHIPPED | OUTPATIENT
Start: 2023-04-11

## 2023-04-11 RX ORDER — TRAZODONE HYDROCHLORIDE 150 MG/1
TABLET ORAL
Qty: 90 TABLET | Refills: 3 | Status: SHIPPED | OUTPATIENT
Start: 2023-04-11 | End: 2023-06-05

## 2023-04-11 NOTE — PROGRESS NOTES
Subjective:       Patient ID: Dano Chau is a 82 y.o. male.    Chief Complaint: Follow-up and Blood Pressure Check    Annual exam.  Follow-up hypertension insomnia shortness of breath immunization deficiency.  He has a history of cervical lumbar spondylosis.  He denies headache chest pain palpitations or edema.  He has some mild stable urine hesitancy.  He reports some shortness of breath with increased physical activity which is stable.  He discontinued cigarette smoking in the past.  He last used Xanax about a year ago.  He takes it occasionally for sleep.    Follow-up  Pertinent negatives include no abdominal pain, chest pain, chills, congestion, coughing, fever, headaches or weakness.   Review of Systems   Constitutional:  Negative for activity change, appetite change, chills, fever and unexpected weight change.   HENT:  Negative for congestion.    Respiratory:  Positive for shortness of breath. Negative for cough, chest tightness and wheezing.    Cardiovascular:  Negative for chest pain, palpitations and leg swelling.   Gastrointestinal:  Negative for abdominal distention, abdominal pain, constipation and diarrhea.   Genitourinary:  Negative for difficulty urinating, dysuria, frequency, hematuria and urgency.        Mild stable hesitancy   Neurological:  Negative for dizziness, weakness, light-headedness and headaches.   Psychiatric/Behavioral:  Positive for sleep disturbance.      Objective:      Physical Exam  Constitutional:       General: He is not in acute distress.     Appearance: He is not diaphoretic.   HENT:      Nose: Nose normal.   Eyes:      Conjunctiva/sclera: Conjunctivae normal.   Neck:      Comments: Normal thyroid 2+ carotids  Cardiovascular:      Rate and Rhythm: Normal rate and regular rhythm.      Heart sounds: No murmur heard.    No gallop.   Pulmonary:      Effort: Pulmonary effort is normal. No respiratory distress.      Breath sounds: No wheezing, rhonchi or rales.      Comments:  Mild decreased breath sounds throughout  Abdominal:      General: There is no distension.      Palpations: Abdomen is soft. There is no mass.      Tenderness: There is no abdominal tenderness.   Lymphadenopathy:      Cervical: No cervical adenopathy.   Skin:     General: Skin is warm and dry.      Coloration: Skin is not pale.      Findings: No erythema.   Neurological:      Mental Status: He is alert and oriented to person, place, and time.   Psychiatric:         Mood and Affect: Mood normal.         Behavior: Behavior normal.       Lab Visit on 06/28/2021   Component Date Value Ref Range Status    WBC 06/28/2021 8.68  3.90 - 12.70 K/uL Final    RBC 06/28/2021 4.43 (L)  4.60 - 6.20 M/uL Final    Hemoglobin 06/28/2021 14.8  14.0 - 18.0 g/dL Final    Hematocrit 06/28/2021 44.7  40.0 - 54.0 % Final    MCV 06/28/2021 101 (H)  82 - 98 fL Final    MCH 06/28/2021 33.4 (H)  27.0 - 31.0 pg Final    MCHC 06/28/2021 33.1  32.0 - 36.0 g/dL Final    RDW 06/28/2021 13.2  11.5 - 14.5 % Final    Platelets 06/28/2021 265  150 - 450 K/uL Final    MPV 06/28/2021 9.7  9.2 - 12.9 fL Final    Immature Granulocytes 06/28/2021 0.3  0.0 - 0.5 % Final    Gran # (ANC) 06/28/2021 5.5  1.8 - 7.7 K/uL Final    Immature Grans (Abs) 06/28/2021 0.03  0.00 - 0.04 K/uL Final    Lymph # 06/28/2021 1.6  1.0 - 4.8 K/uL Final    Mono # 06/28/2021 1.2 (H)  0.3 - 1.0 K/uL Final    Eos # 06/28/2021 0.3  0.0 - 0.5 K/uL Final    Baso # 06/28/2021 0.05  0.00 - 0.20 K/uL Final    nRBC 06/28/2021 0  0 /100 WBC Final    Gran % 06/28/2021 63.3  38.0 - 73.0 % Final    Lymph % 06/28/2021 18.9  18.0 - 48.0 % Final    Mono % 06/28/2021 13.9  4.0 - 15.0 % Final    Eosinophil % 06/28/2021 3.0  0.0 - 8.0 % Final    Basophil % 06/28/2021 0.6  0.0 - 1.9 % Final    Differential Method 06/28/2021 Automated   Final    Sodium 06/28/2021 138  136 - 145 mmol/L Final    Potassium 06/28/2021 4.1  3.5 - 5.1 mmol/L Final    Chloride 06/28/2021 103  95 - 110 mmol/L Final    CO2  06/28/2021 22 (L)  23 - 29 mmol/L Final    Glucose 06/28/2021 115 (H)  70 - 110 mg/dL Final    BUN 06/28/2021 24 (H)  8 - 23 mg/dL Final    Creatinine 06/28/2021 1.3  0.5 - 1.4 mg/dL Final    Calcium 06/28/2021 9.4  8.7 - 10.5 mg/dL Final    Total Protein 06/28/2021 6.6  6.0 - 8.4 g/dL Final    Albumin 06/28/2021 3.6  3.5 - 5.2 g/dL Final    Total Bilirubin 06/28/2021 0.8  0.1 - 1.0 mg/dL Final    Alkaline Phosphatase 06/28/2021 75  55 - 135 U/L Final    AST 06/28/2021 24  10 - 40 U/L Final    ALT 06/28/2021 20  10 - 44 U/L Final    Anion Gap 06/28/2021 13  8 - 16 mmol/L Final    eGFR if  06/28/2021 59.6 (A)  >60 mL/min/1.73 m^2 Final    eGFR if non African American 06/28/2021 51.5 (A)  >60 mL/min/1.73 m^2 Final    Cholesterol 06/28/2021 162  120 - 199 mg/dL Final    Triglycerides 06/28/2021 175 (H)  30 - 150 mg/dL Final    HDL 06/28/2021 36 (L)  40 - 75 mg/dL Final    LDL Cholesterol 06/28/2021 91.0  63.0 - 159.0 mg/dL Final    HDL/Cholesterol Ratio 06/28/2021 22.2  20.0 - 50.0 % Final    Total Cholesterol/HDL Ratio 06/28/2021 4.5  2.0 - 5.0 Final    Non-HDL Cholesterol 06/28/2021 126  mg/dL Final    TSH 06/28/2021 2.593  0.400 - 4.000 uIU/mL Final     Assessment:       1. Annual physical exam    2. Sedative hypnotic or anxiolytic dependence    3. Chronic bronchitis with productive mucopurulent cough    4. Immunization deficiency    5. Essential hypertension    6. Shortness of breath    7. Insomnia, unspecified type        Plan:   Blood pressure 140/86.  He reports he has not taking lisinopril in over a month.  Medications be refilled.  Routine lab was ordered.  Health maintenance reviewed.  He agrees to pneumococcal 20.  Follow-up in 6 months.      Annual physical exam    Sedative hypnotic or anxiolytic dependence    Chronic bronchitis with productive mucopurulent cough    Immunization deficiency    Essential hypertension    Shortness of breath    Insomnia, unspecified type

## 2023-06-05 RX ORDER — TRAZODONE HYDROCHLORIDE 150 MG/1
150 TABLET ORAL NIGHTLY
Qty: 90 TABLET | Refills: 3 | Status: SHIPPED | OUTPATIENT
Start: 2023-06-05

## 2023-06-05 NOTE — TELEPHONE ENCOUNTER
Refill Decision Note      Refill Decision Note   Dano Chau  is requesting a refill authorization.  Brief Assessment and Rationale for Refill:  Approve     Medication Therapy Plan:         Comments:     Note composed:5:22 AM 06/05/2023             Appointments     Last Visit   4/11/2023 Sharif Santacruz MD   Next Visit   Visit date not found Sharif Santacruz MD

## 2023-06-05 NOTE — TELEPHONE ENCOUNTER
No care due was identified.  [facility] Embedded Care Due Messages. Reference number: [id number].   6/05/2023 1:56:06 AM CDT

## 2023-06-08 NOTE — TELEPHONE ENCOUNTER
No care due was identified.  [facility] Embedded Care Due Messages. Reference number: [id number].   6/08/2023 1:32:04 PM CDT

## 2023-06-09 RX ORDER — ALPRAZOLAM 0.5 MG/1
TABLET ORAL
Qty: 30 TABLET | Refills: 0 | Status: SHIPPED | OUTPATIENT
Start: 2023-06-09 | End: 2023-12-11 | Stop reason: SDUPTHER

## 2023-07-17 PROBLEM — Z00.00 ANNUAL PHYSICAL EXAM: Status: RESOLVED | Noted: 2021-06-28 | Resolved: 2023-07-17

## 2023-12-11 ENCOUNTER — OFFICE VISIT (OUTPATIENT)
Dept: INTERNAL MEDICINE | Facility: CLINIC | Age: 83
End: 2023-12-11
Payer: MEDICARE

## 2023-12-11 VITALS
WEIGHT: 206.13 LBS | TEMPERATURE: 98 F | BODY MASS INDEX: 32.35 KG/M2 | OXYGEN SATURATION: 98 % | HEIGHT: 67 IN | DIASTOLIC BLOOD PRESSURE: 84 MMHG | HEART RATE: 98 BPM | SYSTOLIC BLOOD PRESSURE: 138 MMHG

## 2023-12-11 DIAGNOSIS — Z28.39 IMMUNIZATION DEFICIENCY: ICD-10-CM

## 2023-12-11 DIAGNOSIS — I10 ESSENTIAL HYPERTENSION: ICD-10-CM

## 2023-12-11 DIAGNOSIS — G47.00 INSOMNIA, UNSPECIFIED TYPE: ICD-10-CM

## 2023-12-11 DIAGNOSIS — F41.9 ANXIETY: ICD-10-CM

## 2023-12-11 DIAGNOSIS — R09.81 CHRONIC NASAL CONGESTION: Primary | ICD-10-CM

## 2023-12-11 PROCEDURE — 99999 PR PBB SHADOW E&M-EST. PATIENT-LVL IV: CPT | Mod: PBBFAC,,, | Performed by: FAMILY MEDICINE

## 2023-12-11 PROCEDURE — 3288F FALL RISK ASSESSMENT DOCD: CPT | Mod: CPTII,S$GLB,, | Performed by: FAMILY MEDICINE

## 2023-12-11 PROCEDURE — 3288F PR FALLS RISK ASSESSMENT DOCUMENTED: ICD-10-PCS | Mod: CPTII,S$GLB,, | Performed by: FAMILY MEDICINE

## 2023-12-11 PROCEDURE — G0008 ADMIN INFLUENZA VIRUS VAC: HCPCS | Mod: S$GLB,,, | Performed by: FAMILY MEDICINE

## 2023-12-11 PROCEDURE — 3079F PR MOST RECENT DIASTOLIC BLOOD PRESSURE 80-89 MM HG: ICD-10-PCS | Mod: CPTII,S$GLB,, | Performed by: FAMILY MEDICINE

## 2023-12-11 PROCEDURE — 1159F MED LIST DOCD IN RCRD: CPT | Mod: CPTII,S$GLB,, | Performed by: FAMILY MEDICINE

## 2023-12-11 PROCEDURE — 1101F PT FALLS ASSESS-DOCD LE1/YR: CPT | Mod: CPTII,S$GLB,, | Performed by: FAMILY MEDICINE

## 2023-12-11 PROCEDURE — 3075F SYST BP GE 130 - 139MM HG: CPT | Mod: CPTII,S$GLB,, | Performed by: FAMILY MEDICINE

## 2023-12-11 PROCEDURE — 90694 FLU VACCINE - QUADRIVALENT - ADJUVANTED: ICD-10-PCS | Mod: S$GLB,,, | Performed by: FAMILY MEDICINE

## 2023-12-11 PROCEDURE — 99999 PR PBB SHADOW E&M-EST. PATIENT-LVL IV: ICD-10-PCS | Mod: PBBFAC,,, | Performed by: FAMILY MEDICINE

## 2023-12-11 PROCEDURE — 1101F PR PT FALLS ASSESS DOC 0-1 FALLS W/OUT INJ PAST YR: ICD-10-PCS | Mod: CPTII,S$GLB,, | Performed by: FAMILY MEDICINE

## 2023-12-11 PROCEDURE — 3075F PR MOST RECENT SYSTOLIC BLOOD PRESS GE 130-139MM HG: ICD-10-PCS | Mod: CPTII,S$GLB,, | Performed by: FAMILY MEDICINE

## 2023-12-11 PROCEDURE — 99214 OFFICE O/P EST MOD 30 MIN: CPT | Mod: 25,S$GLB,, | Performed by: FAMILY MEDICINE

## 2023-12-11 PROCEDURE — G0008 FLU VACCINE - QUADRIVALENT - ADJUVANTED: ICD-10-PCS | Mod: S$GLB,,, | Performed by: FAMILY MEDICINE

## 2023-12-11 PROCEDURE — 3079F DIAST BP 80-89 MM HG: CPT | Mod: CPTII,S$GLB,, | Performed by: FAMILY MEDICINE

## 2023-12-11 PROCEDURE — 90694 VACC AIIV4 NO PRSRV 0.5ML IM: CPT | Mod: S$GLB,,, | Performed by: FAMILY MEDICINE

## 2023-12-11 PROCEDURE — 1159F PR MEDICATION LIST DOCUMENTED IN MEDICAL RECORD: ICD-10-PCS | Mod: CPTII,S$GLB,, | Performed by: FAMILY MEDICINE

## 2023-12-11 PROCEDURE — 99214 PR OFFICE/OUTPT VISIT, EST, LEVL IV, 30-39 MIN: ICD-10-PCS | Mod: 25,S$GLB,, | Performed by: FAMILY MEDICINE

## 2023-12-11 PROCEDURE — 1126F PR PAIN SEVERITY QUANTIFIED, NO PAIN PRESENT: ICD-10-PCS | Mod: CPTII,S$GLB,, | Performed by: FAMILY MEDICINE

## 2023-12-11 PROCEDURE — 1126F AMNT PAIN NOTED NONE PRSNT: CPT | Mod: CPTII,S$GLB,, | Performed by: FAMILY MEDICINE

## 2023-12-11 RX ORDER — AZELASTINE 1 MG/ML
1 SPRAY, METERED NASAL 2 TIMES DAILY
Qty: 30 ML | Refills: 1 | Status: SHIPPED | OUTPATIENT
Start: 2023-12-11 | End: 2024-12-10

## 2023-12-11 RX ORDER — ALPRAZOLAM 0.5 MG/1
TABLET ORAL
Qty: 30 TABLET | Refills: 0 | Status: SHIPPED | OUTPATIENT
Start: 2023-12-11

## 2023-12-11 RX ORDER — FLUTICASONE PROPIONATE 50 MCG
2 SPRAY, SUSPENSION (ML) NASAL DAILY
Qty: 18 G | Refills: 1 | Status: SHIPPED | OUTPATIENT
Start: 2023-12-11

## 2023-12-11 RX ORDER — TRAZODONE HYDROCHLORIDE 150 MG/1
150 TABLET ORAL NIGHTLY
Qty: 90 TABLET | Refills: 1 | Status: SHIPPED | OUTPATIENT
Start: 2023-12-11

## 2023-12-11 NOTE — PATIENT INSTRUCTIONS
Saline 2 sprays each nostril three times a day over the counter  Flonase 2 sprays each nostril one time a day   Astelin 1 spray each nostril twice  a day

## 2023-12-11 NOTE — PROGRESS NOTES
Subjective:       Patient ID: Dano Chau is a 83 y.o. male.    Chief Complaint: Other and Sinus Problem    3 years duration of nasal congestion and sinus pressure.  He also would like a prescription for insomnia.  He used trazodone 150 mg in the past.  He is intolerant of Remeron.  He also would like a refill on he denies chest pain palpitations or edema.    Sinus Problem  Associated symptoms include congestion and sinus pressure. Pertinent negatives include no chills, coughing, headaches or shortness of breath.     Review of Systems   Constitutional:  Negative for chills and fever.   HENT:  Positive for congestion, postnasal drip and sinus pressure.    Respiratory:  Negative for cough, chest tightness, shortness of breath and wheezing.    Cardiovascular:  Negative for chest pain, palpitations and leg swelling.   Gastrointestinal:  Negative for abdominal distention and abdominal pain.   Genitourinary:  Negative for difficulty urinating.   Neurological:  Negative for dizziness, weakness, light-headedness and headaches.   Psychiatric/Behavioral:  Positive for sleep disturbance. The patient is nervous/anxious.        Objective:      Physical Exam  Constitutional:       General: He is not in acute distress.     Appearance: He is not ill-appearing or diaphoretic.   HENT:      Right Ear: Tympanic membrane normal.      Left Ear: Tympanic membrane normal.      Nose: Congestion present.      Mouth/Throat:      Pharynx: No oropharyngeal exudate or posterior oropharyngeal erythema.   Cardiovascular:      Rate and Rhythm: Normal rate and regular rhythm.      Heart sounds: No murmur heard.     No gallop.   Pulmonary:      Effort: Pulmonary effort is normal. No respiratory distress.      Breath sounds: No wheezing, rhonchi or rales.   Abdominal:      General: There is no distension.   Lymphadenopathy:      Cervical: No cervical adenopathy.   Neurological:      Mental Status: He is alert.   Psychiatric:         Mood and  Affect: Mood normal.         Behavior: Behavior normal.         Lab Visit on 04/11/2023   Component Date Value Ref Range Status    WBC 04/11/2023 6.82  3.90 - 12.70 K/uL Final    RBC 04/11/2023 4.74  4.60 - 6.20 M/uL Final    Hemoglobin 04/11/2023 15.5  14.0 - 18.0 g/dL Final    Hematocrit 04/11/2023 47.4  40.0 - 54.0 % Final    MCV 04/11/2023 100 (H)  82 - 98 fL Final    MCH 04/11/2023 32.7 (H)  27.0 - 31.0 pg Final    MCHC 04/11/2023 32.7  32.0 - 36.0 g/dL Final    RDW 04/11/2023 13.1  11.5 - 14.5 % Final    Platelets 04/11/2023 319  150 - 450 K/uL Final    MPV 04/11/2023 10.3  9.2 - 12.9 fL Final    Immature Granulocytes 04/11/2023 0.4  0.0 - 0.5 % Final    Gran # (ANC) 04/11/2023 4.5  1.8 - 7.7 K/uL Final    Immature Grans (Abs) 04/11/2023 0.03  0.00 - 0.04 K/uL Final    Lymph # 04/11/2023 1.2  1.0 - 4.8 K/uL Final    Mono # 04/11/2023 0.9  0.3 - 1.0 K/uL Final    Eos # 04/11/2023 0.2  0.0 - 0.5 K/uL Final    Baso # 04/11/2023 0.04  0.00 - 0.20 K/uL Final    nRBC 04/11/2023 0  0 /100 WBC Final    Gran % 04/11/2023 65.2  38.0 - 73.0 % Final    Lymph % 04/11/2023 17.9 (L)  18.0 - 48.0 % Final    Mono % 04/11/2023 12.8  4.0 - 15.0 % Final    Eosinophil % 04/11/2023 3.1  0.0 - 8.0 % Final    Basophil % 04/11/2023 0.6  0.0 - 1.9 % Final    Differential Method 04/11/2023 Automated   Final    Sodium 04/11/2023 140  136 - 145 mmol/L Final    Potassium 04/11/2023 4.2  3.5 - 5.1 mmol/L Final    Chloride 04/11/2023 106  95 - 110 mmol/L Final    CO2 04/11/2023 25  23 - 29 mmol/L Final    Glucose 04/11/2023 118 (H)  70 - 110 mg/dL Final    BUN 04/11/2023 17  8 - 23 mg/dL Final    Creatinine 04/11/2023 1.0  0.5 - 1.4 mg/dL Final    Calcium 04/11/2023 9.0  8.7 - 10.5 mg/dL Final    Total Protein 04/11/2023 6.8  6.0 - 8.4 g/dL Final    Albumin 04/11/2023 3.6  3.5 - 5.2 g/dL Final    Total Bilirubin 04/11/2023 0.5  0.1 - 1.0 mg/dL Final    Alkaline Phosphatase 04/11/2023 113  55 - 135 U/L Final    AST 04/11/2023 24  10 - 40 U/L  Final    ALT 04/11/2023 19  10 - 44 U/L Final    Anion Gap 04/11/2023 9  8 - 16 mmol/L Final    eGFR 04/11/2023 >60.0  >60 mL/min/1.73 m^2 Final    Cholesterol 04/11/2023 163  120 - 199 mg/dL Final    Triglycerides 04/11/2023 110  30 - 150 mg/dL Final    HDL 04/11/2023 36 (L)  40 - 75 mg/dL Final    LDL Cholesterol 04/11/2023 105.0  63.0 - 159.0 mg/dL Final    HDL/Cholesterol Ratio 04/11/2023 22.1  20.0 - 50.0 % Final    Total Cholesterol/HDL Ratio 04/11/2023 4.5  2.0 - 5.0 Final    Non-HDL Cholesterol 04/11/2023 127  mg/dL Final    TSH 04/11/2023 2.269  0.400 - 4.000 uIU/mL Final     Assessment:       1. Chronic nasal congestion    2. Essential hypertension    3. Insomnia, unspecified type    4. Anxiety    5. Immunization deficiency        Plan:     Trazodone and Xanax.  Recommend nose spray Astelin and Flonase.  Follow-up in 6 weeks.  Consider ENT referral.  Health maintenance reviewed.  Influenza RSV was ordered    Chronic nasal congestion    Essential hypertension    Insomnia, unspecified type    Anxiety    Immunization deficiency    Other orders  -     azelastine (ASTELIN) 137 mcg (0.1 %) nasal spray; 1 spray (137 mcg total) by Nasal route 2 (two) times daily.  Dispense: 30 mL; Refill: 1  -     fluticasone propionate (FLONASE) 50 mcg/actuation nasal spray; 2 sprays (100 mcg total) by Each Nostril route once daily.  Dispense: 18 g; Refill: 1  -     Influenza (FLUAD) - Quadrivalent (Adjuvanted) *Preferred* (65+) (PF)  -     traZODone (DESYREL) 150 MG tablet; Take 1 tablet (150 mg total) by mouth every evening.  Dispense: 90 tablet; Refill: 1  -     ALPRAZolam (XANAX) 0.5 MG tablet; TAKE 1 TABLET EVERY NIGHT AS NEEDED FOR ANXIETY  Dispense: 30 tablet; Refill: 0

## 2024-07-31 ENCOUNTER — OFFICE VISIT (OUTPATIENT)
Dept: INTERNAL MEDICINE | Facility: CLINIC | Age: 84
End: 2024-07-31
Payer: MEDICARE

## 2024-07-31 ENCOUNTER — HOSPITAL ENCOUNTER (OUTPATIENT)
Dept: RADIOLOGY | Facility: HOSPITAL | Age: 84
Discharge: HOME | End: 2024-07-31
Payer: MEDICARE

## 2024-07-31 VITALS
OXYGEN SATURATION: 94 % | SYSTOLIC BLOOD PRESSURE: 136 MMHG | DIASTOLIC BLOOD PRESSURE: 88 MMHG | HEIGHT: 67 IN | BODY MASS INDEX: 33.21 KG/M2 | WEIGHT: 211.63 LBS | HEART RATE: 106 BPM | TEMPERATURE: 99 F

## 2024-07-31 DIAGNOSIS — M54.31 RIGHT SCIATIC NERVE PAIN: ICD-10-CM

## 2024-07-31 DIAGNOSIS — M25.552 BILATERAL HIP PAIN: Primary | ICD-10-CM

## 2024-07-31 DIAGNOSIS — F13.20 SEDATIVE HYPNOTIC OR ANXIOLYTIC DEPENDENCE: ICD-10-CM

## 2024-07-31 DIAGNOSIS — I10 ESSENTIAL HYPERTENSION: ICD-10-CM

## 2024-07-31 DIAGNOSIS — J41.1 CHRONIC BRONCHITIS WITH PRODUCTIVE MUCOPURULENT COUGH: ICD-10-CM

## 2024-07-31 DIAGNOSIS — M47.816 SPONDYLOSIS OF LUMBAR REGION WITHOUT MYELOPATHY OR RADICULOPATHY: ICD-10-CM

## 2024-07-31 DIAGNOSIS — M25.551 BILATERAL HIP PAIN: ICD-10-CM

## 2024-07-31 DIAGNOSIS — M25.551 BILATERAL HIP PAIN: Primary | ICD-10-CM

## 2024-07-31 DIAGNOSIS — I70.0 AORTIC ATHEROSCLEROSIS: ICD-10-CM

## 2024-07-31 DIAGNOSIS — M25.552 BILATERAL HIP PAIN: ICD-10-CM

## 2024-07-31 PROCEDURE — 73521 X-RAY EXAM HIPS BI 2 VIEWS: CPT | Mod: 26,,, | Performed by: RADIOLOGY

## 2024-07-31 PROCEDURE — 3079F DIAST BP 80-89 MM HG: CPT | Mod: CPTII,S$GLB,,

## 2024-07-31 PROCEDURE — 1101F PT FALLS ASSESS-DOCD LE1/YR: CPT | Mod: CPTII,S$GLB,,

## 2024-07-31 PROCEDURE — G2211 COMPLEX E/M VISIT ADD ON: HCPCS | Mod: S$GLB,,,

## 2024-07-31 PROCEDURE — 1160F RVW MEDS BY RX/DR IN RCRD: CPT | Mod: CPTII,S$GLB,,

## 2024-07-31 PROCEDURE — 1125F AMNT PAIN NOTED PAIN PRSNT: CPT | Mod: CPTII,S$GLB,,

## 2024-07-31 PROCEDURE — 72100 X-RAY EXAM L-S SPINE 2/3 VWS: CPT | Mod: TC

## 2024-07-31 PROCEDURE — 1159F MED LIST DOCD IN RCRD: CPT | Mod: CPTII,S$GLB,,

## 2024-07-31 PROCEDURE — 73521 X-RAY EXAM HIPS BI 2 VIEWS: CPT | Mod: TC

## 2024-07-31 PROCEDURE — 99999 PR PBB SHADOW E&M-EST. PATIENT-LVL IV: CPT | Mod: PBBFAC,,,

## 2024-07-31 PROCEDURE — 3288F FALL RISK ASSESSMENT DOCD: CPT | Mod: CPTII,S$GLB,,

## 2024-07-31 PROCEDURE — 72100 X-RAY EXAM L-S SPINE 2/3 VWS: CPT | Mod: 26,,, | Performed by: RADIOLOGY

## 2024-07-31 PROCEDURE — 3075F SYST BP GE 130 - 139MM HG: CPT | Mod: CPTII,S$GLB,,

## 2024-07-31 PROCEDURE — 99214 OFFICE O/P EST MOD 30 MIN: CPT | Mod: S$GLB,,,

## 2024-07-31 RX ORDER — DICLOFENAC SODIUM 50 MG/1
50 TABLET, DELAYED RELEASE ORAL 2 TIMES DAILY
Qty: 60 TABLET | Refills: 0 | Status: SHIPPED | OUTPATIENT
Start: 2024-07-31

## 2024-07-31 RX ORDER — METHYLPREDNISOLONE 4 MG/1
TABLET ORAL
Qty: 21 EACH | Refills: 0 | Status: SHIPPED | OUTPATIENT
Start: 2024-07-31 | End: 2024-08-21

## 2024-07-31 RX ORDER — DICLOFENAC SODIUM 10 MG/G
2 GEL TOPICAL 2 TIMES DAILY
Qty: 450 G | Refills: 1 | Status: SHIPPED | OUTPATIENT
Start: 2024-07-31

## 2024-07-31 RX ORDER — TRAMADOL HYDROCHLORIDE 50 MG/1
50 TABLET ORAL EVERY 6 HOURS PRN
Qty: 28 TABLET | Refills: 0 | Status: SHIPPED | OUTPATIENT
Start: 2024-07-31

## 2024-07-31 NOTE — PROGRESS NOTES
Dano Chau  2024  [id number]    Sharif Santacruz MD  Patient Care Team:  Sharif Santacruz MD as PCP - General (Family Medicine)          Visit Type:an urgent visit for a new problem    Chief Complaint:  Chief Complaint   Patient presents with    Hip Pain     Bilateral. Right worse than left       History of Present Illness:    Bilateral hip pain  Over the past 2 weeks, it has progressively gotten worse  Denies any falls or injuries to cause the pain   Pain with ambulation  Denies bowel or bladder dysfunction     Also having pain in the lower back   Pain does not radiate down his leg    History:  Past Medical History:   Diagnosis Date    Anxiety     HTN (hypertension)     Restless leg syndrome      Past Surgical History:   Procedure Laterality Date    INGUINAL HERNIA REPAIR       No family history on file.  Social History     Socioeconomic History    Marital status:    Tobacco Use    Smoking status: Former     Current packs/day: 0.00     Types: Cigarettes     Quit date:      Years since quittin.6    Smokeless tobacco: Never   Substance and Sexual Activity    Alcohol use: Yes    Drug use: No    Sexual activity: Yes     Patient Active Problem List   Diagnosis    Spondylosis of lumbar region without myelopathy or radiculopathy    Spondylosis of cervical region without myelopathy or radiculopathy    Immunization deficiency    Insomnia    Shortness of breath    Essential hypertension    Sedative hypnotic or anxiolytic dependence    Aortic atherosclerosis    Chronic bronchitis with productive mucopurulent cough    Anxiety    Chronic nasal congestion     Review of patient's allergies indicates:   Allergen Reactions    Mirtazapine Anxiety       The following were reviewed at this visit: active problem list, medication list, allergies, family history, social history, and health maintenance.    Medications:  Current Outpatient Medications on File Prior to Visit   Medication Sig Dispense Refill     acetaminophen (TYLENOL) 325 MG tablet Take 650 mg by mouth.      ALPRAZolam (XANAX) 0.5 MG tablet TAKE 1 TABLET EVERY NIGHT AS NEEDED FOR ANXIETY 30 tablet 0    aspirin 325 MG tablet Take 325 mg by mouth once daily.       azelastine (ASTELIN) 137 mcg (0.1 %) nasal spray 1 spray (137 mcg total) by Nasal route 2 (two) times daily. 30 mL 1    fluticasone propionate (FLONASE) 50 mcg/actuation nasal spray 2 sprays (100 mcg total) by Each Nostril route once daily. 18 g 1    glucosamine-chondroitin 500-400 mg tablet Take by mouth once daily.       lisinopriL 10 MG tablet TAKE 1 TABLET EVERY DAY 90 tablet 3    MILK THISTLE ORAL Take 1,000 mg by mouth once daily.      multivitamin (THERAGRAN) per tablet Take by mouth.      saw palmetto 160 MG capsule Take 160 mg by mouth.      terazosin (HYTRIN) 5 MG capsule TAKE 1 CAPSULE EVERY DAY 90 capsule 3    traZODone (DESYREL) 150 MG tablet Take 1 tablet (150 mg total) by mouth every evening. 90 tablet 1    fluticasone (FLONASE) 50 mcg/actuation nasal spray 1 spray by Nasal route.      RSVPreF3 antigen-AS01E, PF, (AREXVY, PF,) 120 mcg/0.5 mL SusR vaccine Inject into the muscle. 0.5 mL 0    traZODone (DESYREL) 150 MG tablet Take 1 tablet (150 mg total) by mouth nightly. 90 tablet 3     No current facility-administered medications on file prior to visit.       Medications have been reviewed and reconciled with patient at this visit.  Barriers to medications reviewed with patient.    Adverse reactions to current medications reviewed with patient..    Over the counter medications reviewed and reconciled with patient.    Exam:  Wt Readings from Last 3 Encounters:   07/31/24 96 kg (211 lb 10.3 oz)   12/11/23 93.5 kg (206 lb 2.1 oz)   04/11/23 90.1 kg (198 lb 10.2 oz)     Temp Readings from Last 3 Encounters:   07/31/24 98.5 °F (36.9 °C) (Tympanic)   12/11/23 98.4 °F (36.9 °C) (Temporal)   04/11/23 98.4 °F (36.9 °C) (Temporal)     BP Readings from Last 3 Encounters:   07/31/24 (!) 136/94    12/11/23 138/84   04/11/23 (!) 140/86     Pulse Readings from Last 3 Encounters:   07/31/24 106   12/11/23 98   04/11/23 104     Body mass index is 33.15 kg/m².      Review of Systems   Musculoskeletal:  Positive for back pain and joint pain. Negative for falls.     Physical Exam  Nursing note reviewed.   Constitutional:       Appearance: He is obese.   HENT:      Head: Normocephalic and atraumatic.   Cardiovascular:      Rate and Rhythm: Regular rhythm.      Heart sounds: Normal heart sounds.   Pulmonary:      Effort: Pulmonary effort is normal. No respiratory distress.   Musculoskeletal:         General: Tenderness present.      Lumbar back: Spasms and tenderness present.      Right hip: Tenderness present. Decreased range of motion.      Left hip: Tenderness present. Decreased range of motion.      Comments: Positive straight leg test    Neurological:      Mental Status: He is alert and oriented to person, place, and time.   Psychiatric:         Mood and Affect: Mood normal.         Behavior: Behavior normal.         Thought Content: Thought content normal.         Judgment: Judgment normal.         Laboratory Reviewed ({Yes)  Lab Results   Component Value Date    WBC 6.82 04/11/2023    HGB 15.5 04/11/2023    HCT 47.4 04/11/2023     04/11/2023    CHOL 163 04/11/2023    TRIG 110 04/11/2023    HDL 36 (L) 04/11/2023    ALT 19 04/11/2023    AST 24 04/11/2023     04/11/2023    K 4.2 04/11/2023     04/11/2023    CREATININE 1.0 04/11/2023    BUN 17 04/11/2023    CO2 25 04/11/2023    TSH 2.269 04/11/2023    INR 1.0 02/07/2018     Dano was seen today for hip pain.    Diagnoses and all orders for this visit:    Bilateral hip pain  -     X-Ray Hip 3 or 4 views Bilateral; Future  -     methylPREDNISolone (MEDROL DOSEPACK) 4 mg tablet; use as directed  -     diclofenac (VOLTAREN) 50 MG EC tablet; Take 1 tablet (50 mg total) by mouth 2 (two) times daily.  -     traMADoL (ULTRAM) 50 mg tablet; Take 1  tablet (50 mg total) by mouth every 6 (six) hours as needed for Pain.  -     diclofenac sodium (VOLTAREN ARTHRITIS PAIN) 1 % Gel; Apply 2 g topically 2 (two) times daily.    Spondylosis of lumbar region without myelopathy or radiculopathy  -     X-Ray Lumbar Spine 5 View; Future  -     methylPREDNISolone (MEDROL DOSEPACK) 4 mg tablet; use as directed  -     diclofenac (VOLTAREN) 50 MG EC tablet; Take 1 tablet (50 mg total) by mouth 2 (two) times daily.  -     traMADoL (ULTRAM) 50 mg tablet; Take 1 tablet (50 mg total) by mouth every 6 (six) hours as needed for Pain.  -     diclofenac sodium (VOLTAREN ARTHRITIS PAIN) 1 % Gel; Apply 2 g topically 2 (two) times daily.    Right sciatic nerve pain  -     X-Ray Lumbar Spine 5 View; Future  -     methylPREDNISolone (MEDROL DOSEPACK) 4 mg tablet; use as directed  -     diclofenac (VOLTAREN) 50 MG EC tablet; Take 1 tablet (50 mg total) by mouth 2 (two) times daily.  -     traMADoL (ULTRAM) 50 mg tablet; Take 1 tablet (50 mg total) by mouth every 6 (six) hours as needed for Pain.  -     diclofenac sodium (VOLTAREN ARTHRITIS PAIN) 1 % Gel; Apply 2 g topically 2 (two) times daily.    Essential hypertension  -     CBC Auto Differential; Future  -     Lipid Panel; Future  -     Comprehensive Metabolic Panel; Future    Chronic bronchitis with productive mucopurulent cough  -     CBC Auto Differential; Future  -     Lipid Panel; Future  -     Comprehensive Metabolic Panel; Future    Sedative hypnotic or anxiolytic dependence  -     CBC Auto Differential; Future  -     Lipid Panel; Future  -     Comprehensive Metabolic Panel; Future    Aortic atherosclerosis  -     CBC Auto Differential; Future  -     Lipid Panel; Future  -     Comprehensive Metabolic Panel; Future    Short course of tramadol for pain  Will get OTC tylenol and alternate between tylenol and diclofenac  If pain does not improve, can refer to PT and or back and spine clinic     Schedule a follow up appointment with   Noam in 6 months     Visit today included increased complexity associated with the care of the episodic problem Lumbar and Hip pain  , which was addressed while instituting co-management of the longitudinal care of the patient due to the serious and/or complex managed problem(s) .    I have evaluated and discussed management associated with medical care services that serve as the continuing focal point for all needed health care services and/or with medical care services that are part of ongoing care related to my patient's single, serious condition or a complex condition(s).    I am providing ongoing care and I am the primary care provider for this patient, and they are being managed, monitored, and/or observed for their chronic conditions over time.     I have addressed their ongoing health maintenance requirements and needs for all health care services and reviewed co-management plans provided by specialty providers when available.    Health Maintenance Due   Topic Date Due    Shingles Vaccine (1 of 2) Never done    COVID-19 Vaccine (4 - 2023-24 season) 09/01/2023              Care Plan/Goals: Reviewed    Goals    None         Follow up: No follow-ups on file.    After visit summary was printed and given to patient upon discharge today.  Patient goals and care plan are included in After Visit Summary.

## 2024-07-31 NOTE — PATIENT INSTRUCTIONS
Alternate between taking the OTC tylenol arthritis and the diclofenac as needed for pain     Use the steroid dose pack as instructed per package    Use the tramadol for extreme pain

## 2024-08-01 DIAGNOSIS — M25.552 BILATERAL HIP PAIN: Primary | ICD-10-CM

## 2024-08-01 DIAGNOSIS — M25.551 BILATERAL HIP PAIN: Primary | ICD-10-CM

## 2024-08-01 DIAGNOSIS — M54.31 RIGHT SCIATIC NERVE PAIN: ICD-10-CM

## 2024-08-01 DIAGNOSIS — M47.816 SPONDYLOSIS OF LUMBAR REGION WITHOUT MYELOPATHY OR RADICULOPATHY: ICD-10-CM

## 2024-12-11 ENCOUNTER — OFFICE VISIT (OUTPATIENT)
Dept: INTERNAL MEDICINE | Facility: CLINIC | Age: 84
End: 2024-12-11
Payer: MEDICARE

## 2024-12-11 VITALS
BODY MASS INDEX: 33.36 KG/M2 | WEIGHT: 213 LBS | OXYGEN SATURATION: 97 % | HEART RATE: 94 BPM | TEMPERATURE: 97 F | DIASTOLIC BLOOD PRESSURE: 84 MMHG | RESPIRATION RATE: 16 BRPM | SYSTOLIC BLOOD PRESSURE: 124 MMHG

## 2024-12-11 DIAGNOSIS — I10 ESSENTIAL HYPERTENSION: ICD-10-CM

## 2024-12-11 DIAGNOSIS — M25.551 BILATERAL HIP PAIN: ICD-10-CM

## 2024-12-11 DIAGNOSIS — M25.551 RIGHT HIP PAIN: Primary | ICD-10-CM

## 2024-12-11 DIAGNOSIS — M25.552 BILATERAL HIP PAIN: ICD-10-CM

## 2024-12-11 PROCEDURE — 1159F MED LIST DOCD IN RCRD: CPT | Mod: CPTII,S$GLB,,

## 2024-12-11 PROCEDURE — 3074F SYST BP LT 130 MM HG: CPT | Mod: CPTII,S$GLB,,

## 2024-12-11 PROCEDURE — 1101F PT FALLS ASSESS-DOCD LE1/YR: CPT | Mod: CPTII,S$GLB,,

## 2024-12-11 PROCEDURE — 3079F DIAST BP 80-89 MM HG: CPT | Mod: CPTII,S$GLB,,

## 2024-12-11 PROCEDURE — 1125F AMNT PAIN NOTED PAIN PRSNT: CPT | Mod: CPTII,S$GLB,,

## 2024-12-11 PROCEDURE — 99214 OFFICE O/P EST MOD 30 MIN: CPT | Mod: S$GLB,,,

## 2024-12-11 PROCEDURE — 99999 PR PBB SHADOW E&M-EST. PATIENT-LVL V: CPT | Mod: PBBFAC,,,

## 2024-12-11 PROCEDURE — 3288F FALL RISK ASSESSMENT DOCD: CPT | Mod: CPTII,S$GLB,,

## 2024-12-11 PROCEDURE — G2211 COMPLEX E/M VISIT ADD ON: HCPCS | Mod: S$GLB,,,

## 2024-12-11 PROCEDURE — 1160F RVW MEDS BY RX/DR IN RCRD: CPT | Mod: CPTII,S$GLB,,

## 2024-12-11 RX ORDER — DICLOFENAC SODIUM 50 MG/1
50 TABLET, DELAYED RELEASE ORAL 2 TIMES DAILY PRN
Qty: 60 TABLET | Refills: 0 | Status: SHIPPED | OUTPATIENT
Start: 2024-12-11

## 2024-12-11 RX ORDER — METHYLPREDNISOLONE 4 MG/1
TABLET ORAL
Qty: 21 EACH | Refills: 0 | Status: SHIPPED | OUTPATIENT
Start: 2024-12-11 | End: 2025-01-01

## 2024-12-11 RX ORDER — GABAPENTIN 300 MG/1
300 CAPSULE ORAL 3 TIMES DAILY PRN
Qty: 90 CAPSULE | Refills: 1 | Status: SHIPPED | OUTPATIENT
Start: 2024-12-11

## 2024-12-11 NOTE — PROGRESS NOTES
"Dano Chau  12/11/2024  [id number]    Sharif Santacruz MD  Patient Care Team:  Sharif Santacruz MD as PCP - General (Family Medicine)          Visit Type:an urgent visit for an existing problem     Chief Complaint:  Chief Complaint   Patient presents with    Hip Pain       History of Present Illness:    History of Present Illness    CHIEF COMPLAINT:  Mr. Chau presents today for right hip pain.    HIP PAIN:  He reports right hip pain that started months ago, progressively worsening. The pain is located in the front joint of the hip and disappears when lying in bed but returns when walking. Previously, he experienced back pain which has now shifted to the right hip area. Lying down in bed is the most comfortable position and helps alleviate pain.    IMAGING:  X-ray of the hip and back in July showed degenerative changes and arthritis in both areas, with narrowing of joint spaces.    MEDICATIONS:  He is taking PM medication to help with sleep due to pain. He reports taking OTC Aleve for pain relief, but effectiveness is uncertain as it may not be taken regularly. He previously tried Voltaren (diclofenac) pills, which provided minimal relief.    FUNCTIONAL STATUS:  He has stopped leaving the house due to pain. He spends most of his time in a recliner, which may be contributing to poor posture as he is reportedly "all hunched up" in the chair.      ROS:  General: -fever, -chills, -fatigue, -weight gain, -weight loss  Eyes: -vision changes, -redness, -discharge  ENT: -ear pain, -nasal congestion, -sore throat  Cardiovascular: -chest pain, -palpitations, -lower extremity edema  Respiratory: -cough, -shortness of breath  Gastrointestinal: -abdominal pain, -nausea, -vomiting, -diarrhea, -constipation, -blood in stool  Genitourinary: -dysuria, -hematuria, -frequency  Musculoskeletal: +joint pain, -muscle pain  Skin: -rash, -lesion  Neurological: -headache, -dizziness, -numbness, -tingling  Psychiatric: -anxiety, " -depression, +sleep difficulty            History:  Past Medical History:   Diagnosis Date    Anxiety     HTN (hypertension)     Restless leg syndrome      Past Surgical History:   Procedure Laterality Date    INGUINAL HERNIA REPAIR       No family history on file.  Social History     Socioeconomic History    Marital status:    Tobacco Use    Smoking status: Former     Current packs/day: 0.00     Types: Cigarettes     Quit date:      Years since quittin.9    Smokeless tobacco: Never   Substance and Sexual Activity    Alcohol use: Yes    Drug use: No    Sexual activity: Yes     Patient Active Problem List   Diagnosis    Spondylosis of lumbar region without myelopathy or radiculopathy    Spondylosis of cervical region without myelopathy or radiculopathy    Immunization deficiency    Insomnia    Shortness of breath    Essential hypertension    Sedative hypnotic or anxiolytic dependence    Aortic atherosclerosis    Chronic bronchitis with productive mucopurulent cough    Anxiety    Chronic nasal congestion     Review of patient's allergies indicates:   Allergen Reactions    Mirtazapine Anxiety       The following were reviewed at this visit: active problem list, medication list, allergies, family history, social history, and health maintenance.    Medications:  Current Outpatient Medications on File Prior to Visit   Medication Sig Dispense Refill    acetaminophen (TYLENOL) 325 MG tablet Take 650 mg by mouth.      ALPRAZolam (XANAX) 0.5 MG tablet TAKE 1 TABLET EVERY NIGHT AS NEEDED FOR ANXIETY 30 tablet 0    aspirin 325 MG tablet Take 325 mg by mouth once daily.       diclofenac sodium (VOLTAREN ARTHRITIS PAIN) 1 % Gel Apply 2 g topically 2 (two) times daily. 450 g 1    fluticasone propionate (FLONASE) 50 mcg/actuation nasal spray 2 sprays (100 mcg total) by Each Nostril route once daily. 18 g 1    lisinopriL 10 MG tablet TAKE 1 TABLET EVERY DAY 90 tablet 3    MILK THISTLE  ORAL Take 1,000 mg by mouth once daily.      multivitamin (THERAGRAN) per tablet Take by mouth.      saw palmetto 160 MG capsule Take 160 mg by mouth.      terazosin (HYTRIN) 5 MG capsule TAKE 1 CAPSULE EVERY DAY 90 capsule 3    traMADoL (ULTRAM) 50 mg tablet Take 1 tablet (50 mg total) by mouth every 6 (six) hours as needed for Pain. 28 tablet 0    traZODone (DESYREL) 150 MG tablet Take 1 tablet (150 mg total) by mouth every evening. 90 tablet 1    [DISCONTINUED] diclofenac (VOLTAREN) 50 MG EC tablet Take 1 tablet (50 mg total) by mouth 2 (two) times daily. 60 tablet 0    azelastine (ASTELIN) 137 mcg (0.1 %) nasal spray 1 spray (137 mcg total) by Nasal route 2 (two) times daily. 30 mL 1    glucosamine-chondroitin 500-400 mg tablet Take by mouth once daily.  (Patient not taking: Reported on 12/11/2024)       No current facility-administered medications on file prior to visit.       Medications have been reviewed and reconciled with patient at this visit.  Barriers to medications reviewed with patient.    Adverse reactions to current medications reviewed with patient..    Over the counter medications reviewed and reconciled with patient.    Exam:  Wt Readings from Last 3 Encounters:   12/11/24 96.6 kg (213 lb)   07/31/24 96 kg (211 lb 10.3 oz)   12/11/23 93.5 kg (206 lb 2.1 oz)     Temp Readings from Last 3 Encounters:   12/11/24 97.2 °F (36.2 °C) (Tympanic)   07/31/24 98.5 °F (36.9 °C) (Tympanic)   12/11/23 98.4 °F (36.9 °C) (Temporal)     BP Readings from Last 3 Encounters:   12/11/24 124/84   07/31/24 136/88   12/11/23 138/84     Pulse Readings from Last 3 Encounters:   12/11/24 94   07/31/24 106   12/11/23 98     Body mass index is 33.36 kg/m².    Physical Exam  Nursing note reviewed.   HENT:      Head: Normocephalic and atraumatic.   Cardiovascular:      Rate and Rhythm: Normal rate and regular rhythm.      Heart sounds: Normal heart sounds.   Pulmonary:      Effort: Pulmonary effort is normal. No  respiratory distress.      Breath sounds: Normal breath sounds.   Musculoskeletal:         General: Tenderness present.      Right hip: Tenderness present. Decreased range of motion.      Comments: The patient is using an assistive device during the visit     Neurological:      Mental Status: He is alert and oriented to person, place, and time.      Motor: Weakness present.      Gait: Gait abnormal.   Psychiatric:         Mood and Affect: Mood normal.         Behavior: Behavior normal.         Thought Content: Thought content normal.         Judgment: Judgment normal.         Laboratory Reviewed ({Yes)  Lab Results   Component Value Date    WBC 8.99 07/31/2024    HGB 15.8 07/31/2024    HCT 48.7 07/31/2024     07/31/2024    CHOL 168 07/31/2024    TRIG 139 07/31/2024    HDL 40 07/31/2024    ALT 26 07/31/2024    AST 36 07/31/2024     07/31/2024    K 4.1 07/31/2024     07/31/2024    CREATININE 1.0 07/31/2024    BUN 15 07/31/2024    CO2 28 07/31/2024    TSH 2.269 04/11/2023    INR 1.0 02/07/2018       Dano was seen today for hip pain.    Diagnoses and all orders for this visit:    Right hip pain  -     Ambulatory referral/consult to Orthopedics; Future  -     methylPREDNISolone (MEDROL DOSEPACK) 4 mg tablet; use as directed  -     diclofenac (VOLTAREN) 50 MG EC tablet; Take 1 tablet (50 mg total) by mouth 2 (two) times daily as needed (Pain).  -     gabapentin (NEURONTIN) 300 MG capsule; Take 1 capsule (300 mg total) by mouth 3 (three) times daily as needed (Pain).    Bilateral hip pain  -     diclofenac (VOLTAREN) 50 MG EC tablet; Take 1 tablet (50 mg total) by mouth 2 (two) times daily as needed (Pain).  -     gabapentin (NEURONTIN) 300 MG capsule; Take 1 capsule (300 mg total) by mouth 3 (three) times daily as needed (Pain).    Essential hypertension  At visit, Blood pressure is at goal. Continue current medications          Assessment & Plan    RIGHT HIP OSTEOARTHRITIS:  - Assessed right hip pain,  likely due to degenerative changes and arthritis as shown in previous x-ray.  - Determined pain is relieved when patient is lying down, but returns with movement.   anti-inflammatories, and steroids to address hip impingement and pain.  - Explained that hip pain is likely due to arthritis and degenerative changes related to age and wear and tear.  - Discussed that narrowing in disc spaces can cause impingement in the hip, leading to pain when walking.  - Started steroid Dosepak as prescribed.  - Started diclofenac (Voltaren) as needed for pain.  - Recommend OTC Tylenol arthritis as needed for pain.  - Referred to orthopedist specializing in hip issues for evaluation.    LUMBAR DISC DEGENERATION:  - Considered sciatic nerve involvement.  Pt declined referral to PT at this time     PAIN MANAGEMENT:  - Considered gabapentin as potential pain management option.  - Started gabapentin as needed for pain.    FOLLOW-UP:  - Follow up with orthopedist for hip evaluation.    Visit today included increased complexity associated with the care of the episodic problem right hip pain , which was addressed while instituting co-management of the longitudinal care of the patient due to the serious and/or complex managed problem(s) .    I have evaluated and discussed management associated with medical care services that serve as the continuing focal point for all needed health care services and/or with medical care services that are part of ongoing care related to my patient's single, serious condition or a complex condition(s).    I am providing ongoing care and I am the primary care provider for this patient, and they are being managed, monitored, and/or observed for their chronic conditions over time.     I have addressed their ongoing health maintenance requirements and needs for all health care services and reviewed co-management plans provided by specialty providers when available.    Health Maintenance Due   Topic Date Due     Shingles Vaccine (1 of 2) Never done    Influenza Vaccine (1) 09/01/2024    COVID-19 Vaccine (4 - 2024-25 season) 09/01/2024          Care Plan/Goals: Reviewed    Goals    None         Follow up: No follow-ups on file.    After visit summary was printed and given to patient upon discharge today.  Patient goals and care plan are included in After Visit Summary.

## 2024-12-13 ENCOUNTER — OFFICE VISIT (OUTPATIENT)
Dept: ORTHOPEDICS | Facility: CLINIC | Age: 84
End: 2024-12-13
Payer: MEDICARE

## 2024-12-13 VITALS
HEART RATE: 92 BPM | BODY MASS INDEX: 33.42 KG/M2 | WEIGHT: 212.94 LBS | SYSTOLIC BLOOD PRESSURE: 157 MMHG | HEIGHT: 67 IN | DIASTOLIC BLOOD PRESSURE: 98 MMHG

## 2024-12-13 DIAGNOSIS — M25.551 RIGHT HIP PAIN: ICD-10-CM

## 2024-12-13 PROCEDURE — 99999 PR PBB SHADOW E&M-EST. PATIENT-LVL IV: CPT | Mod: PBBFAC,,, | Performed by: PHYSICIAN ASSISTANT

## 2024-12-13 NOTE — PROGRESS NOTES
Subjective:      Patient ID: Dano Chau is a 84 y.o. male.    History of Present Illness    CHIEF COMPLAINT:  - Dano presents today for initial evaluation of left hip pain that has been ongoing since July.    HPI:  Dano presents with hip pain attributed to age, which started in July and has worsened considerably. After sitting for extended periods, he experiences pain when standing up and cannot rest his body on the affected leg to start walking. The pain is localized to the front of the hip. He denies any trauma or injury causing this pain, stating he has not done anything to create this. The pain occurs in the same place when the leg is lifted straight up.    He has been using Voltaren gel, oral anti-inflammatories, and aspirin for pain management, but reports these interventions are not providing sufficient relief. A steroid Dosepak was recently prescribed but not yet started. He expresses reluctance towards needles and injections. He acknowledges his condition as wear and tear due to age.    Dano's spouse mentions that he is experiencing significant difficulty walking and needing support. Dano does not want to use a walker, expressing reluctance to use assistive devices.    He denies any numbness or tingling. He denies any history of recent trauma or injury to the hip.        Past Medical History:   Diagnosis Date    Anxiety     HTN (hypertension)     Restless leg syndrome      Current Outpatient Medications:     acetaminophen (TYLENOL) 325 MG tablet, Take 650 mg by mouth., Disp: , Rfl:     ALPRAZolam (XANAX) 0.5 MG tablet, TAKE 1 TABLET EVERY NIGHT AS NEEDED FOR ANXIETY, Disp: 30 tablet, Rfl: 0    aspirin 325 MG tablet, Take 325 mg by mouth once daily. , Disp: , Rfl:     diclofenac (VOLTAREN) 50 MG EC tablet, Take 1 tablet (50 mg total) by mouth 2 (two) times daily as needed (Pain)., Disp: 60 tablet, Rfl: 0    diclofenac sodium (VOLTAREN ARTHRITIS PAIN) 1 % Gel, Apply 2 g topically 2 (two) times daily.,  "Disp: 450 g, Rfl: 1    gabapentin (NEURONTIN) 300 MG capsule, Take 1 capsule (300 mg total) by mouth 3 (three) times daily as needed (Pain)., Disp: 90 capsule, Rfl: 1    glucosamine-chondroitin 500-400 mg tablet, Take by mouth once daily.  (Patient not taking: Reported on 12/11/2024), Disp: , Rfl:     lisinopriL 10 MG tablet, TAKE 1 TABLET EVERY DAY, Disp: 90 tablet, Rfl: 3    methylPREDNISolone (MEDROL DOSEPACK) 4 mg tablet, use as directed, Disp: 21 each, Rfl: 0    MILK THISTLE ORAL, Take 1,000 mg by mouth once daily., Disp: , Rfl:     multivitamin (THERAGRAN) per tablet, Take by mouth., Disp: , Rfl:     traMADoL (ULTRAM) 50 mg tablet, Take 1 tablet (50 mg total) by mouth every 6 (six) hours as needed for Pain., Disp: 28 tablet, Rfl: 0    traZODone (DESYREL) 150 MG tablet, Take 1 tablet (150 mg total) by mouth every evening., Disp: 90 tablet, Rfl: 1    Review of patient's allergies indicates:   Allergen Reactions    Mirtazapine Anxiety       BP (!) 157/98 (BP Location: Left arm, Patient Position: Sitting)   Pulse 92   Ht 5' 7" (1.702 m)   Wt 96.6 kg (212 lb 15.4 oz)   BMI 33.35 kg/m²       Objective:    Ortho Exam   Right hip:   Skin is intact   No edema   TTP diffusely   Pain increases with internal/external rotation   Hip ROM is decreased secondary to stiffness   Calf and compartments are soft and compressible   Motor exam normal   Sensation and pulses intact  Cap refill brisk    GEN: Well developed, well nourished male. AAOX3. No acute distress.   Head: Normocephalic, atraumatic.   Eyes: ANGEL LUIS  Neck: Trachea is midline, no adenopathy  Resp: Breathing unlabored.  Neuro: Motor function normal, Cranial nerves intact  Psych: Mood and affect appropriate.    Assessment:     Imaging:  X-ray bilateral hips was reviewed and shows severe bilateral degenerative changes of the femoroacetabular joints.  No acute fracture or dislocation.      1. Right hip pain        Plan:     - Reviewed the radiographs with the patient " and explained that they indicate severe arthritis.    - Recommended patient continue taking the steroid Dosepak as well as oral and topical anti-inflammatories as prescribed by his primary care provider.  - Discussed the possibility of referring to Interventional Radiology for hip joint injection if these therapies are not effective.  - Follow up in 3 weeks to assess effectiveness of steroid Dosepak and discuss potential need for injection. Dano instructed to cancel appointment if doing well and not needing injection.      Follow up in about 3 weeks (around 1/3/2025).      Patient note was created using MModal Dictation.  Any errors in syntax or even information may not have been identified and edited on initial review prior to signing this note.    This note was generated with the assistance of ambient listening technology. Verbal consent was obtained by the patient and accompanying visitor(s) for the recording of patient appointment to facilitate this note. I attest to having reviewed and edited the generated note for accuracy, though some syntax or spelling errors may persist. Please contact the author of this note for any clarification.